# Patient Record
Sex: FEMALE | Race: BLACK OR AFRICAN AMERICAN | Employment: OTHER | URBAN - METROPOLITAN AREA
[De-identification: names, ages, dates, MRNs, and addresses within clinical notes are randomized per-mention and may not be internally consistent; named-entity substitution may affect disease eponyms.]

---

## 2017-03-27 ENCOUNTER — HOSPITAL ENCOUNTER (EMERGENCY)
Age: 67
Discharge: HOME | End: 2017-03-27
Payer: COMMERCIAL

## 2017-03-27 ENCOUNTER — APPOINTMENT (OUTPATIENT)
Dept: GENERAL RADIOLOGY | Age: 67
End: 2017-03-27
Payer: COMMERCIAL

## 2017-03-27 VITALS
SYSTOLIC BLOOD PRESSURE: 158 MMHG | HEART RATE: 70 BPM | TEMPERATURE: 98.6 F | RESPIRATION RATE: 16 BRPM | OXYGEN SATURATION: 98 % | WEIGHT: 143 LBS | DIASTOLIC BLOOD PRESSURE: 78 MMHG | BODY MASS INDEX: 25.33 KG/M2

## 2017-03-27 DIAGNOSIS — R91.1 INCIDENTAL LUNG NODULE: ICD-10-CM

## 2017-03-27 DIAGNOSIS — R07.9 CHEST PAIN, UNSPECIFIED TYPE: Primary | ICD-10-CM

## 2017-03-27 LAB
ALBUMIN SERPL-MCNC: 4.1 G/DL (ref 3.9–4.9)
ALP BLD-CCNC: 56 U/L (ref 40–130)
ALT SERPL-CCNC: 13 U/L (ref 0–33)
ANION GAP SERPL CALCULATED.3IONS-SCNC: 11 MEQ/L (ref 7–13)
AST SERPL-CCNC: 18 U/L (ref 0–35)
BILIRUB SERPL-MCNC: 0.4 MG/DL (ref 0–1.2)
BUN BLDV-MCNC: 19 MG/DL (ref 8–23)
CALCIUM SERPL-MCNC: 9.4 MG/DL (ref 8.6–10.2)
CHLORIDE BLD-SCNC: 99 MEQ/L (ref 98–107)
CO2: 24 MEQ/L (ref 22–29)
CREAT SERPL-MCNC: 0.89 MG/DL (ref 0.5–0.9)
EKG ATRIAL RATE: 72 BPM
EKG P AXIS: 64 DEGREES
EKG P-R INTERVAL: 204 MS
EKG Q-T INTERVAL: 408 MS
EKG QRS DURATION: 108 MS
EKG QTC CALCULATION (BAZETT): 446 MS
EKG R AXIS: -49 DEGREES
EKG T AXIS: 70 DEGREES
EKG VENTRICULAR RATE: 72 BPM
GFR AFRICAN AMERICAN: >60
GFR NON-AFRICAN AMERICAN: >60
GLOBULIN: 3.7 G/DL (ref 2.3–3.5)
GLUCOSE BLD-MCNC: 134 MG/DL (ref 74–109)
HCT VFR BLD CALC: 33.3 % (ref 37–47)
HEMOGLOBIN: 11.3 G/DL (ref 12–16)
MCH RBC QN AUTO: 27.7 PG (ref 27–31.3)
MCHC RBC AUTO-ENTMCNC: 34 % (ref 33–37)
MCV RBC AUTO: 81.5 FL (ref 82–100)
PDW BLD-RTO: 13.5 % (ref 11.5–14.5)
PLATELET # BLD: 260 K/UL (ref 130–400)
POTASSIUM SERPL-SCNC: 4.9 MEQ/L (ref 3.5–5.1)
RBC # BLD: 4.08 M/UL (ref 4.2–5.4)
SODIUM BLD-SCNC: 134 MEQ/L (ref 132–144)
TOTAL CK: 45 U/L (ref 0–170)
TOTAL PROTEIN: 7.8 G/DL (ref 6.4–8.1)
TROPONIN: <0.01 NG/ML (ref 0–0.01)
WBC # BLD: 5.5 K/UL (ref 4.8–10.8)

## 2017-03-27 PROCEDURE — 99285 EMERGENCY DEPT VISIT HI MDM: CPT

## 2017-03-27 PROCEDURE — 71010 XR CHEST PORTABLE: CPT

## 2017-03-27 PROCEDURE — 82550 ASSAY OF CK (CPK): CPT

## 2017-03-27 PROCEDURE — 36415 COLL VENOUS BLD VENIPUNCTURE: CPT

## 2017-03-27 PROCEDURE — 84484 ASSAY OF TROPONIN QUANT: CPT

## 2017-03-27 PROCEDURE — 80053 COMPREHEN METABOLIC PANEL: CPT

## 2017-03-27 PROCEDURE — 93005 ELECTROCARDIOGRAM TRACING: CPT

## 2017-03-27 PROCEDURE — 85027 COMPLETE CBC AUTOMATED: CPT

## 2017-03-27 RX ORDER — LEVOFLOXACIN 750 MG/1
750 TABLET ORAL DAILY
COMMUNITY
End: 2017-03-29 | Stop reason: ALTCHOICE

## 2017-03-27 RX ORDER — BENZONATATE 100 MG/1
100 CAPSULE ORAL 3 TIMES DAILY PRN
COMMUNITY
End: 2017-03-29 | Stop reason: ALTCHOICE

## 2017-03-27 RX ORDER — CEPHALEXIN 500 MG/1
500 CAPSULE ORAL 4 TIMES DAILY
COMMUNITY
End: 2017-03-29 | Stop reason: ALTCHOICE

## 2017-03-27 ASSESSMENT — ENCOUNTER SYMPTOMS
CHOKING: 0
DIARRHEA: 0
CONSTIPATION: 0
EYE DISCHARGE: 0
SORE THROAT: 0
ABDOMINAL PAIN: 0
VOMITING: 0
STRIDOR: 0
NAUSEA: 0
RHINORRHEA: 0
ABDOMINAL DISTENTION: 0
SHORTNESS OF BREATH: 0
WHEEZING: 0
COUGH: 0
COLOR CHANGE: 0

## 2017-03-29 ENCOUNTER — OFFICE VISIT (OUTPATIENT)
Dept: CARDIOLOGY | Age: 67
End: 2017-03-29

## 2017-03-29 VITALS
WEIGHT: 140.4 LBS | HEART RATE: 72 BPM | DIASTOLIC BLOOD PRESSURE: 70 MMHG | SYSTOLIC BLOOD PRESSURE: 120 MMHG | BODY MASS INDEX: 23.97 KG/M2 | HEIGHT: 64 IN | OXYGEN SATURATION: 98 %

## 2017-03-29 DIAGNOSIS — I73.9 PERIPHERAL ARTERIAL DISEASE (HCC): ICD-10-CM

## 2017-03-29 DIAGNOSIS — Z79.4 TYPE 2 DIABETES MELLITUS TREATED WITH INSULIN (HCC): ICD-10-CM

## 2017-03-29 DIAGNOSIS — R07.81 PLEURITIC CHEST PAIN: ICD-10-CM

## 2017-03-29 DIAGNOSIS — I10 ESSENTIAL HYPERTENSION: ICD-10-CM

## 2017-03-29 DIAGNOSIS — I25.10 CORONARY ARTERY DISEASE INVOLVING NATIVE CORONARY ARTERY OF NATIVE HEART WITHOUT ANGINA PECTORIS: ICD-10-CM

## 2017-03-29 DIAGNOSIS — R94.31 ABNORMAL EKG: ICD-10-CM

## 2017-03-29 DIAGNOSIS — E11.9 TYPE 2 DIABETES MELLITUS TREATED WITH INSULIN (HCC): ICD-10-CM

## 2017-03-29 DIAGNOSIS — E78.5 DYSLIPIDEMIA: ICD-10-CM

## 2017-03-29 PROCEDURE — 99214 OFFICE O/P EST MOD 30 MIN: CPT | Performed by: PHYSICIAN ASSISTANT

## 2017-03-29 RX ORDER — SIMVASTATIN 40 MG
40 TABLET ORAL NIGHTLY
Qty: 90 TABLET | Refills: 3 | Status: SHIPPED | OUTPATIENT
Start: 2017-03-29

## 2017-03-29 RX ORDER — ISOSORBIDE MONONITRATE 30 MG/1
30 TABLET, EXTENDED RELEASE ORAL DAILY
Qty: 90 TABLET | Refills: 3 | Status: SHIPPED | OUTPATIENT
Start: 2017-03-29 | End: 2018-04-20 | Stop reason: SDUPTHER

## 2017-03-29 ASSESSMENT — ENCOUNTER SYMPTOMS
VOMITING: 0
SINUS PRESSURE: 0
NAUSEA: 0
BLOOD IN STOOL: 0
COLOR CHANGE: 0
CHEST TIGHTNESS: 0
SHORTNESS OF BREATH: 0
DIARRHEA: 0
COUGH: 1
RHINORRHEA: 0

## 2017-08-01 RX ORDER — ISOSORBIDE MONONITRATE 30 MG/1
TABLET, EXTENDED RELEASE ORAL
Qty: 90 TABLET | Refills: 2 | Status: SHIPPED | OUTPATIENT
Start: 2017-08-01 | End: 2017-09-13

## 2017-08-31 ENCOUNTER — HOSPITAL ENCOUNTER (OUTPATIENT)
Dept: CT IMAGING | Age: 67
Discharge: HOME | End: 2017-08-31
Payer: MEDICARE

## 2017-08-31 VITALS
SYSTOLIC BLOOD PRESSURE: 155 MMHG | BODY MASS INDEX: 23.9 KG/M2 | HEIGHT: 64 IN | RESPIRATION RATE: 16 BRPM | DIASTOLIC BLOOD PRESSURE: 71 MMHG | WEIGHT: 140 LBS | HEART RATE: 72 BPM

## 2017-08-31 DIAGNOSIS — R91.1 LUNG NODULE: ICD-10-CM

## 2017-08-31 PROCEDURE — 71250 CT THORAX DX C-: CPT

## 2017-09-13 ENCOUNTER — OFFICE VISIT (OUTPATIENT)
Dept: PULMONOLOGY | Age: 67
End: 2017-09-13

## 2017-09-13 VITALS
RESPIRATION RATE: 18 BRPM | OXYGEN SATURATION: 98 % | HEIGHT: 64 IN | HEART RATE: 72 BPM | DIASTOLIC BLOOD PRESSURE: 90 MMHG | SYSTOLIC BLOOD PRESSURE: 154 MMHG | TEMPERATURE: 96.9 F | BODY MASS INDEX: 24.75 KG/M2 | WEIGHT: 145 LBS

## 2017-09-13 DIAGNOSIS — J84.10 POSTINFLAMMATORY PULMONARY FIBROSIS (HCC): Primary | ICD-10-CM

## 2017-09-13 DIAGNOSIS — J47.9 BRONCHIECTASIS WITHOUT COMPLICATION (HCC): ICD-10-CM

## 2017-09-13 PROCEDURE — 99204 OFFICE O/P NEW MOD 45 MIN: CPT | Performed by: INTERNAL MEDICINE

## 2017-09-13 ASSESSMENT — ENCOUNTER SYMPTOMS
DIARRHEA: 0
CHEST TIGHTNESS: 0
SORE THROAT: 0
NAUSEA: 0
SHORTNESS OF BREATH: 0
COUGH: 0
VOMITING: 0
SINUS PRESSURE: 0
ABDOMINAL PAIN: 0
RHINORRHEA: 0
WHEEZING: 0

## 2017-10-02 ENCOUNTER — HOSPITAL ENCOUNTER (OUTPATIENT)
Dept: GENERAL RADIOLOGY | Age: 67
Discharge: HOME | End: 2017-10-02
Payer: MEDICARE

## 2017-10-02 DIAGNOSIS — R60.9 EDEMA, UNSPECIFIED TYPE: ICD-10-CM

## 2017-10-02 PROCEDURE — 73630 X-RAY EXAM OF FOOT: CPT

## 2017-11-14 ENCOUNTER — HOSPITAL ENCOUNTER (OUTPATIENT)
Dept: WOMENS IMAGING | Age: 67
Discharge: HOME | End: 2017-11-14
Payer: MEDICARE

## 2017-11-14 DIAGNOSIS — Z12.31 ENCOUNTER FOR SCREENING MAMMOGRAM FOR BREAST CANCER: ICD-10-CM

## 2017-11-14 PROCEDURE — 77063 BREAST TOMOSYNTHESIS BI: CPT

## 2017-12-08 ENCOUNTER — HOSPITAL ENCOUNTER (OUTPATIENT)
Dept: CT IMAGING | Age: 67
Discharge: HOME | End: 2017-12-08
Payer: MEDICARE

## 2017-12-08 DIAGNOSIS — J47.9 BRONCHIECTASIS WITHOUT COMPLICATION (HCC): ICD-10-CM

## 2017-12-08 DIAGNOSIS — J84.10 POSTINFLAMMATORY PULMONARY FIBROSIS (HCC): ICD-10-CM

## 2017-12-08 PROCEDURE — 71250 CT THORAX DX C-: CPT

## 2018-04-24 RX ORDER — ISOSORBIDE MONONITRATE 30 MG/1
30 TABLET, EXTENDED RELEASE ORAL DAILY
Qty: 90 TABLET | Refills: 2 | Status: SHIPPED | OUTPATIENT
Start: 2018-04-24

## 2018-09-26 ENCOUNTER — HOSPITAL ENCOUNTER (OUTPATIENT)
Dept: GENERAL RADIOLOGY | Age: 68
Discharge: HOME | End: 2018-09-28

## 2018-09-26 DIAGNOSIS — M79.672 LEFT FOOT PAIN: ICD-10-CM

## 2018-09-26 PROCEDURE — 73630 X-RAY EXAM OF FOOT: CPT

## 2019-02-22 RX ORDER — ISOSORBIDE MONONITRATE 30 MG/1
30 TABLET, EXTENDED RELEASE ORAL DAILY
Qty: 90 TABLET | Refills: 2 | OUTPATIENT
Start: 2019-02-22

## 2023-06-20 ENCOUNTER — HOSPITAL ENCOUNTER (EMERGENCY)
Age: 73
Discharge: HOME | End: 2023-06-20
Attending: EMERGENCY MEDICINE
Payer: COMMERCIAL

## 2023-06-20 ENCOUNTER — APPOINTMENT (OUTPATIENT)
Dept: CT IMAGING | Age: 73
End: 2023-06-20
Payer: COMMERCIAL

## 2023-06-20 VITALS
DIASTOLIC BLOOD PRESSURE: 68 MMHG | HEIGHT: 65 IN | TEMPERATURE: 98.3 F | WEIGHT: 140 LBS | HEART RATE: 77 BPM | RESPIRATION RATE: 20 BRPM | SYSTOLIC BLOOD PRESSURE: 106 MMHG | BODY MASS INDEX: 23.32 KG/M2 | OXYGEN SATURATION: 94 %

## 2023-06-20 DIAGNOSIS — S01.81XA FACIAL LACERATION, INITIAL ENCOUNTER: Primary | ICD-10-CM

## 2023-06-20 DIAGNOSIS — S09.90XA CLOSED HEAD INJURY, INITIAL ENCOUNTER: ICD-10-CM

## 2023-06-20 PROCEDURE — 12011 RPR F/E/E/N/L/M 2.5 CM/<: CPT

## 2023-06-20 PROCEDURE — 70450 CT HEAD/BRAIN W/O DYE: CPT

## 2023-06-20 PROCEDURE — 99285 EMERGENCY DEPT VISIT HI MDM: CPT

## 2023-06-20 PROCEDURE — 6370000000 HC RX 637 (ALT 250 FOR IP): Performed by: EMERGENCY MEDICINE

## 2023-06-20 PROCEDURE — 2500000003 HC RX 250 WO HCPCS: Performed by: EMERGENCY MEDICINE

## 2023-06-20 RX ORDER — LIDOCAINE HYDROCHLORIDE 20 MG/ML
5 INJECTION, SOLUTION INFILTRATION; PERINEURAL ONCE
Status: COMPLETED | OUTPATIENT
Start: 2023-06-20 | End: 2023-06-20

## 2023-06-20 RX ORDER — CEPHALEXIN 500 MG/1
500 CAPSULE ORAL 3 TIMES DAILY
Qty: 15 CAPSULE | Refills: 0 | Status: SHIPPED | OUTPATIENT
Start: 2023-06-20 | End: 2023-06-25

## 2023-06-20 RX ORDER — BACITRACIN ZINC 500 [USP'U]/G
OINTMENT TOPICAL ONCE
Status: COMPLETED | OUTPATIENT
Start: 2023-06-20 | End: 2023-06-20

## 2023-06-20 RX ADMIN — BACITRACIN ZINC: 500 OINTMENT TOPICAL at 13:50

## 2023-06-20 RX ADMIN — LIDOCAINE HYDROCHLORIDE 5 ML: 20 INJECTION, SOLUTION INFILTRATION; PERINEURAL at 13:21

## 2023-06-20 ASSESSMENT — PAIN SCALES - GENERAL: PAINLEVEL_OUTOF10: 10

## 2023-06-20 ASSESSMENT — LIFESTYLE VARIABLES
HOW OFTEN DO YOU HAVE A DRINK CONTAINING ALCOHOL: NEVER
HOW MANY STANDARD DRINKS CONTAINING ALCOHOL DO YOU HAVE ON A TYPICAL DAY: PATIENT DOES NOT DRINK

## 2023-06-20 ASSESSMENT — PAIN DESCRIPTION - ORIENTATION: ORIENTATION: RIGHT

## 2023-06-20 ASSESSMENT — PAIN DESCRIPTION - LOCATION: LOCATION: EYE

## 2023-06-20 NOTE — ED PROVIDER NOTES
[address] [facility] ED  eMERGENCY dEPARTMENT eNCOUnter      Pt Name: Steven Ramirez  MRN: [id number]  [place] 1950  Date of evaluation: 6/20/2023  Provider: Khadijah Dickson MD    [address] [place] [address]       Chief Complaint   Patient presents with    Fall    Head Injury         HISTORY OF PRESENT ILLNESS   (Location/Symptom, Timing/Onset,Context/Setting, Quality, Duration, Modifying Factors, Severity)  Note limiting factors. Steven Ramirez is a 67 y.o. female who presents to the emergency departmen history of while at work she tripped on a step and fell forward and hit the right side of her forehead. Patient had no loss of consciousness. Patient denies any loss of bowel or bladder control. There is pain only over the right eyelid where the laceration is. Patient denies any numbness or tingling in the arms or legs loss of consciousness. Patient denies any headache chest pain abdominal pain nausea vomiting diarrhea diaphoresis. Patient states she feels fine other than the cut over her right eye. Patient is not on blood thinners other than NSAID in the morning for pain. HPI    NursingNotes were reviewed. REVIEW OF SYSTEMS    (2-9 systems for level 4, 10 or more for level 5)     Review of Systems    Except as noted above the remainder of the review of systems was reviewed and negative.        PAST MEDICAL HISTORY     Past Medical History:   Diagnosis Date    A-fib [facility])     Chronic diastolic heart failure ([facility] [address] [address] 10/8/2015    CKD (chronic kidney disease), stage III ([address] 10/8/2015    Coronary artery disease involving native coronary artery of native heart without angina pectoris 10/8/2015    Essential hypertension 10/8/2015    Family history of coronary artery disease 10/8/2015    History of non-ST elevation myocardial infarction (NSTEMI) 10/8/2015    HTN (hypertension)     Hyperlipidemia 10/8/2015    PAD (peripheral artery disease) ([address] 10/8/2015         SURGICALHISTORY       Past Surgical History:

## 2023-06-20 NOTE — ED TRIAGE NOTES
Pt states that she was at work and tripped over a step while trying to go to the restroom. Pt states that she hit her head, denies LOC, and has a laceration to the right eyebrow. Pt denies any dizziness, blurred vision, or headache. Pt states she takes ASA daily. Incident happened at 1120 this morning.

## 2023-06-20 NOTE — DISCHARGE INSTRUCTIONS
This can be removed in 7 days. For any worsening of any of your condition come back to the ER right away for more open treatment or call 911.

## 2023-06-20 NOTE — ED NOTES
Pt. Had stitches placed over right eye  Swelling noted  Pt. Informed to take otc meds at home for pain/swelling and advised to use ice on the area  Pt. Informed to return if wound seems worse  Otherwise informed to keep wound clean and dry. Given supplies for dressing.         Manpreet Quick RN  06/20/23 [id number]

## 2023-07-21 ENCOUNTER — TRANSCRIBE ORDERS (OUTPATIENT)
Dept: ADMINISTRATIVE | Age: 73
End: 2023-07-21

## 2023-07-21 DIAGNOSIS — S43.401A SPRAIN OF RIGHT SHOULDER, UNSPECIFIED SHOULDER SPRAIN TYPE, INITIAL ENCOUNTER: Primary | ICD-10-CM

## 2023-07-21 DIAGNOSIS — S40.021A CONTUSION OF MULTIPLE SITES OF RIGHT UPPER EXTREMITY, INITIAL ENCOUNTER: ICD-10-CM

## 2023-07-24 ENCOUNTER — HOSPITAL ENCOUNTER (OUTPATIENT)
Dept: MRI IMAGING | Age: 73
Discharge: HOME | End: 2023-07-26
Payer: COMMERCIAL

## 2023-07-24 DIAGNOSIS — S43.401A SPRAIN OF RIGHT SHOULDER, UNSPECIFIED SHOULDER SPRAIN TYPE, INITIAL ENCOUNTER: ICD-10-CM

## 2023-07-24 DIAGNOSIS — S40.021A CONTUSION OF MULTIPLE SITES OF RIGHT UPPER EXTREMITY, INITIAL ENCOUNTER: ICD-10-CM

## 2023-07-24 PROCEDURE — 73221 MRI JOINT UPR EXTREM W/O DYE: CPT

## 2023-07-24 PROCEDURE — 73218 MRI UPPER EXTREMITY W/O DYE: CPT

## 2023-08-02 ENCOUNTER — HOSPITAL ENCOUNTER (OUTPATIENT)
Dept: PHYSICAL THERAPY | Age: 73
Setting detail: THERAPIES SERIES
Discharge: HOME | End: 2023-08-02
Payer: COMMERCIAL

## 2023-08-02 PROCEDURE — 97161 PT EVAL LOW COMPLEX 20 MIN: CPT

## 2023-08-02 PROCEDURE — 97110 THERAPEUTIC EXERCISES: CPT

## 2023-08-02 ASSESSMENT — PAIN DESCRIPTION - ORIENTATION: ORIENTATION: RIGHT

## 2023-08-02 ASSESSMENT — PAIN DESCRIPTION - PAIN TYPE: TYPE: ACUTE PAIN

## 2023-08-02 ASSESSMENT — PAIN DESCRIPTION - LOCATION: LOCATION: ARM;SHOULDER

## 2023-08-02 ASSESSMENT — PAIN DESCRIPTION - DESCRIPTORS: DESCRIPTORS: ACHING;SHOOTING;SHARP

## 2023-08-02 ASSESSMENT — PAIN SCALES - GENERAL: PAINLEVEL_OUTOF10: 8

## 2023-08-02 NOTE — THERAPY EVALUATION
[id number] [facility]     Ph: [phone]  Fax: [phone]    [] Certification  [] Recertification [x]  Plan of Care  [] Progress Note [] Discharge      Referring Provider: Papi Winters MD    From:  Ebony Ordaz, PT,DPT  Patient: Carlos Robert (45 y.o. female) : 1950 Date: 2023   Medical Diagnosis: Unspecified sprain of right shoulder joint, initial encounter [S43.401A]  Posterior subluxation of right humerus, initial encounter [S43.021A]  Laceration without foreign body of unspecified part of head, initial encounter Gregary Sprang  Contusion of unspecified part of head, initial encounter [S00.93XA]    Treatment Diagnosis: R shoulder pain, reduced ROM, reduced strength, difficulty with ADLs    Plan of Care/Certification Expiration Date: 23   Progress Report Period from: 2023  to 2023  Visits to Date: 1 No Show:   Cancelled Appts:      OBJECTIVE:   Long Term Goals - Time Frame for Long Term Goals : 4 weeks  Goals Current/ Discharge status Status   Long Term Goal 1: patient will improve R shoulder ROM flexion and abduction to >/=120deg and IR/ER to equal opposite side to demonstrate improvements in bathing and dressing tasks AROM LUE (degrees)  L Shoulder Flexion (0-180): 135  L Shoulder Extension (0-45): 35  L Shoulder ABduction (0-180): 137  L Shoulder Int Rotation  (0-70): T8  L Shoulder Ext Rotation  (0-90): T3  L Elbow Flexion (0-145): WNL  L Elbow Extension (145-0): WNL   AROM RUE (degrees)  R Shoulder Flexion (0-180): 55  R Shoulder Extension (0-45): 12  R Shoulder ABduction (0-180): 44  R Shoulder Int Rotation  (0-70): R ear  R Shoulder Ext Rotation (0-90): R glute  R Elbow Flexion (0-145):  WNL  R Elbow Extension (145-0): WNL          PROM RUE (degrees)  R Shoulder Flex  (0-180): 104  R Shoulder ABduction (0-180): 94 New   Long Term Goal 2: patient will improve UE strength

## 2023-08-02 NOTE — PROGRESS NOTES
[facility]  PHYSICAL THERAPY EVALUATION    Physical Therapy: Initial Evaluation    Patient: Panfilo Mcdonald (89 y.o.     female)   Examination Date: 2023   :  1950 ;    Confirmed: Yes MRN: [id number]  CSN: [id number]   Insurance: Payor: 3-HAB / Plan: 3-HAB / Product Type: *No Product type* /   Insurance ID:  - (Worker's Comp) Secondary Insurance (if applicable):    Referring Physician: Tony Adamson MD       Visits to Date/Visits Approved:     No Show/Cancelled Appts:   /       Medical Diagnosis: Unspecified sprain of right shoulder joint, initial encounter [S43.401A]  Posterior subluxation of right humerus, initial encounter [S43.021A]  Laceration without foreign body of unspecified part of head, initial encounter [S01.91XA]  Contusion of unspecified part of head, initial encounter [S00.93XA]        Treatment Diagnosis: R shoulder pain, reduced ROM, reduced strength, difficulty with ADLs     PERTINENT MEDICAL HISTORY   Patient Assessed for Rehabilitation Services: Yes       Medical History: Chart Reviewed: Yes   Past Medical History:   Diagnosis Date    A-fib ([address])     Chronic diastolic heart failure ([address]) 10/8/2015    CKD (chronic kidney disease), stage III ([address]) 10/8/2015    Coronary artery disease involving native coronary artery of native heart without angina pectoris 10/8/2015    Essential hypertension 10/8/2015    Family history of coronary artery disease 10/8/2015    History of non-ST elevation myocardial infarction (NSTEMI) 10/8/2015    HTN (hypertension)     Hyperlipidemia 10/8/2015    PAD (peripheral artery disease) ([address]) 10/8/2015     Surgical History:   Past Surgical History:   Procedure Laterality Date    CARDIAC CATHETERIZATION  09/10/15    [facility] DO    CARDIAC CATHETERIZATION      TOE AMPUTATION Right 2016       Medications:   Current Outpatient Medications:     isosorbide mononitrate (IMDUR) 30 MG extended

## 2023-08-07 ENCOUNTER — HOSPITAL ENCOUNTER (OUTPATIENT)
Dept: PHYSICAL THERAPY | Age: 73
Setting detail: THERAPIES SERIES
Discharge: HOME | End: 2023-08-07
Payer: COMMERCIAL

## 2023-08-07 PROCEDURE — 97110 THERAPEUTIC EXERCISES: CPT

## 2023-08-07 ASSESSMENT — PAIN DESCRIPTION - LOCATION: LOCATION: ARM;SHOULDER

## 2023-08-07 ASSESSMENT — PAIN SCALES - GENERAL: PAINLEVEL_OUTOF10: 5

## 2023-08-07 ASSESSMENT — PAIN DESCRIPTION - DESCRIPTORS: DESCRIPTORS: ACHING

## 2023-08-07 ASSESSMENT — PAIN DESCRIPTION - PAIN TYPE: TYPE: ACUTE PAIN

## 2023-08-07 NOTE — PROGRESS NOTES
[facility]  Outpatient Physical Therapy   Treatment Note        Date: 2023  Patient: Paty Patel  : 1950   Confirmed: Yes  MRN: [id number]  Referring Provider: Alison Gates MD      Medical Diagnosis: Unspecified sprain of right shoulder joint, initial encounter [S43.401A]  Posterior subluxation of right humerus, initial encounter [S43.021A]  Laceration without foreign body of unspecified part of head, initial encounter Susi Zelalem  Contusion of unspecified part of head, initial encounter [S00.93XA]      Treatment Diagnosis: R shoulder pain, reduced ROM, reduced strength, difficulty with ADLs    Visit Information:  Insurance: Payor: 3-HAB / Plan: 3-HAB / Product Type: *No Product type* /   PT Visit Information  Onset Date: 23  PT Insurance Information: 3-[facility])  Total # of Visits Approved: 12  Total # of Visits to Date: 2  Plan of Care/Certification Expiration Date: 23  Progress Note Counter:  (C-9: [id number])    Subjective Information:  Subjective: Reports stiffness in arm is reduced, however reports achiness in bone. Reports HEP is going well.  Reports significant pain in shoulder especially when waking up in the morning, causing difficulty getting out of bed  HEP Compliance:  [x] Good [] Fair [] Poor [] Reports not doing due to:    Restrictions/Precautions: None          Pain Screening  Patient Currently in Pain: Yes  Pain Level: 5  Pain Type: Acute pain  Pain Location: Arm, Shoulder  Pain Descriptors: Aching    Treatment:  Exercises:  Exercises  Exercise 1: PROM: flexion, abd, ER x6 min  Exercise 2: dowel: flexion, abduction, ER 2 sets x 10 reps x 3-5 sec holds  Exercise 5: GHJ isometric: flexion, abd, IR/ER 10 reps x 3-5 sec holds  Exercise 6: scap retract x15 reps  Exercise 8: pulleys flexion and abduction x2 min, each  Exercise 13: shoulder rolls fwd/retro x10 reps, each  Exercise 20: HEP: Continue current + shoulder iso, scap retract, shoulder rolls

## 2023-08-10 ENCOUNTER — HOSPITAL ENCOUNTER (OUTPATIENT)
Dept: PHYSICAL THERAPY | Age: 73
Setting detail: THERAPIES SERIES
Discharge: HOME | End: 2023-08-10
Payer: COMMERCIAL

## 2023-08-10 PROCEDURE — G0283 ELEC STIM OTHER THAN WOUND: HCPCS

## 2023-08-10 PROCEDURE — 97110 THERAPEUTIC EXERCISES: CPT

## 2023-08-10 ASSESSMENT — PAIN DESCRIPTION - LOCATION: LOCATION: ARM;SHOULDER

## 2023-08-10 ASSESSMENT — PAIN DESCRIPTION - DESCRIPTORS: DESCRIPTORS: DULL

## 2023-08-10 ASSESSMENT — PAIN DESCRIPTION - PAIN TYPE: TYPE: ACUTE PAIN

## 2023-08-10 ASSESSMENT — PAIN DESCRIPTION - ORIENTATION: ORIENTATION: RIGHT

## 2023-08-10 ASSESSMENT — PAIN SCALES - GENERAL: PAINLEVEL_OUTOF10: 3

## 2023-08-10 NOTE — PROGRESS NOTES
[facility]  Outpatient Physical Therapy    Treatment Note        Date: 8/10/2023  Patient: Jannie Juarez  : 1950   Confirmed: Yes  MRN: [id number]  Referring Provider: Ziggy Nails MD    Medical Diagnosis: Unspecified sprain of right shoulder joint, initial encounter [S43.401A]  Posterior subluxation of right humerus, initial encounter [S43.021A]  Laceration without foreign body of unspecified part of head, initial encounter [S01.91XA]  Contusion of unspecified part of head, initial encounter [S00.93XA]       Treatment Diagnosis: R shoulder pain, reduced ROM, reduced strength, difficulty with ADLs    Visit Information:  Insurance: Payor: 3-HAB / Plan: 3-HAB / Product Type: *No Product type* /   PT Visit Information  Onset Date: 23  PT Insurance Information: 3-[facility])  Total # of Visits Approved: 12  Total # of Visits to Date: 3  Plan of Care/Certification Expiration Date: 23  No Show: 0  Canceled Appointment: 0  Progress Note Counter: 3/12 (C-9: [id number])    Subjective Information:  Subjective: Pt reports reduced pain upon waking and entering clinic,Pt states she was injected in her shoulder with a mm relaxer on Tuesday. Pt states the MD said it is a bone contusion  HEP Compliance:  [x] Good [x] Fair [] Poor [] Reports not doing due to:               Pain Screening  Patient Currently in Pain: Yes  Pain Assessment: 0-10  Pain Level: 3  Pain Type: Acute pain  Pain Location: Arm, Shoulder  Pain Orientation: Right  Pain Descriptors: Dull    Treatment:  Exercises:  Exercises  Exercise 1: PROM: flexion, abd, ER x6 min  Exercise 2: dowel: flexion, abduction, ER 2 sets x 10 reps x 3-5 sec holds  Exercise 6: scap retract x15 reps  Exercise 7: Gentle 3-way bicep x 10 in supine with towel support under elbow  Exercise 8: pulleys flexion and abduction x2 min, each  Exercise 13: shoulder rolls fwd/retro x10 reps, each  Exercise 20: HEP: Continue current +       Modalities:  Cryotherapy

## 2023-08-14 ENCOUNTER — HOSPITAL ENCOUNTER (OUTPATIENT)
Dept: PHYSICAL THERAPY | Age: 73
Setting detail: THERAPIES SERIES
Discharge: HOME | End: 2023-08-14
Payer: COMMERCIAL

## 2023-08-14 PROCEDURE — G0283 ELEC STIM OTHER THAN WOUND: HCPCS

## 2023-08-14 PROCEDURE — 97110 THERAPEUTIC EXERCISES: CPT

## 2023-08-14 ASSESSMENT — PAIN DESCRIPTION - LOCATION: LOCATION: SHOULDER

## 2023-08-14 ASSESSMENT — PAIN DESCRIPTION - DESCRIPTORS: DESCRIPTORS: ACHING

## 2023-08-14 ASSESSMENT — PAIN DESCRIPTION - PAIN TYPE: TYPE: ACUTE PAIN

## 2023-08-14 ASSESSMENT — PAIN SCALES - GENERAL: PAINLEVEL_OUTOF10: 6

## 2023-08-14 ASSESSMENT — PAIN DESCRIPTION - ORIENTATION: ORIENTATION: RIGHT

## 2023-08-14 NOTE — PROGRESS NOTES
activities, such as tolerance to lifting. ROM reassessed with improvements globally, however greatest in abd, flexion and ext. overall patient tolerated tx well with minimal verbal increase in familiar pain, however concluded with CP and IFC to R shoulder for pain reduction. Treatment Diagnosis: R shoulder pain, reduced ROM, reduced strength, difficulty with ADLs     PT Education: Home Exercise Program, Body mechanics  Activity Tolerance  Activity Tolerance: Patient tolerated treatment well    Post-Pain Assessment:       Pain Rating (0-10 pain scale):   /10   Location and pain description same as pre-treatment unless indicated. Action: [] NA   [x] Perform HEP  [] Meds as prescribed  [] Modalities as prescribed   [] Call Physician     GOALS   Patient Goal(s): Patient Goals : To get better    Long Term Goals Completed by 4 weeks Goal Status   LTG 1 patient will improve R shoulder ROM flexion and abduction to >/=120deg and IR/ER to equal opposite side to demonstrate improvements in bathing and dressing tasks In progress   LTG 2 patient will improve UE strength to >/=4/5 for improvements in carrying tasks at wokr In progress   LTG 3 patient will subjectively report avg pain of </=2-3/10 during ADL tasks to demonstrate improvements in pain tolerance and functional mobility In progress   LTG 4 patient will be indep with HEP to self manage symptoms upon d/c In progress   LTG 5 patient will improve UEFI to >/=50/80 for improvements in QOL In progress          Plan:  Frequency/Duration:  Plan  Plan Frequency: 2x  Plan weeks: 4  Current Treatment Recommendations: Strengthening, ROM, Neuromuscular re-education, Manual, Pain management, Return to work related activity, Home exercise program, Patient/Caregiver education & training, Modalities  Modalities: Heat/Cold, E-stim - unattended, Ultrasound  Pt to continue current HEP.   See objective section for any therapeutic exercise changes, additions or modifications this

## 2023-08-17 ENCOUNTER — HOSPITAL ENCOUNTER (OUTPATIENT)
Dept: PHYSICAL THERAPY | Age: 73
Setting detail: THERAPIES SERIES
Discharge: HOME | End: 2023-08-17
Payer: COMMERCIAL

## 2023-08-17 PROCEDURE — G0283 ELEC STIM OTHER THAN WOUND: HCPCS

## 2023-08-17 PROCEDURE — 97035 APP MDLTY 1+ULTRASOUND EA 15: CPT

## 2023-08-17 PROCEDURE — 97110 THERAPEUTIC EXERCISES: CPT

## 2023-08-17 ASSESSMENT — PAIN DESCRIPTION - PAIN TYPE: TYPE: ACUTE PAIN

## 2023-08-17 ASSESSMENT — PAIN SCALES - GENERAL: PAINLEVEL_OUTOF10: 6

## 2023-08-17 ASSESSMENT — PAIN DESCRIPTION - ORIENTATION: ORIENTATION: RIGHT

## 2023-08-17 ASSESSMENT — PAIN DESCRIPTION - DESCRIPTORS: DESCRIPTORS: ACHING

## 2023-08-17 ASSESSMENT — PAIN DESCRIPTION - LOCATION: LOCATION: SHOULDER

## 2023-08-17 NOTE — PROGRESS NOTES
[facility]  Outpatient Physical Therapy   Treatment Note        Date: 2023  Patient: Davey Carranza  : 1950   Confirmed: Yes  MRN: [id number]  Referring Provider: Gómez Haile MD      Medical Diagnosis: Unspecified sprain of right shoulder joint, initial encounter [S43.401A]  Posterior subluxation of right humerus, initial encounter [S43.021A]  Laceration without foreign body of unspecified part of head, initial encounter [S01.91XA]  Contusion of unspecified part of head, initial encounter [S00.93XA]      Treatment Diagnosis: R shoulder pain, reduced ROM, reduced strength, difficulty with ADLs    Visit Information:  Insurance: Payor: 3-HAB / Plan: 3-HAB / Product Type: *No Product type* /   PT Visit Information  Onset Date: 23  PT Insurance Information: 3-[facility])  Total # of Visits Approved: 12  Total # of Visits to Date: 5  Plan of Care/Certification Expiration Date: 23  No Show: 0  Canceled Appointment: 0  Progress Note Counter:  (C-9: [id number])    Subjective Information:  Subjective: Patient reports she does not think therapy is helping too much with the pain, however reports she does not feel as stiff as when first starting. Reports the pain continues to wake her up in the middle of the night. States she will be going to see an orthopedic.   HEP Compliance:  [x] Good [] Fair [] Poor [] Reports not doing due to:    Pain Screening  Patient Currently in Pain: Yes  Pain Level: 6  Pain Type: Acute pain  Pain Location: Shoulder  Pain Orientation: Right  Pain Descriptors: Aching    Treatment:  Exercises:  Exercises  Exercise 3: wall slides: flexion and scaption 10 reps x 5\" holds  Exercise 6: Rows and lats x10 reps x YTB, each  Exercise 9: reactive IR / ER YTB x3 steps x 10 reps, each  Exercise 12: standing AROM: flexion, extension, and abduction x15 reps, each  Exercise 14: UBE fwd/retro L1.0 x5 min total  Exercise 20: HEP: Continue current     Modalities:  Cryotherapy

## 2023-08-21 ENCOUNTER — HOSPITAL ENCOUNTER (OUTPATIENT)
Dept: PHYSICAL THERAPY | Age: 73
Setting detail: THERAPIES SERIES
Discharge: HOME | End: 2023-08-21
Payer: COMMERCIAL

## 2023-08-21 PROCEDURE — 97110 THERAPEUTIC EXERCISES: CPT

## 2023-08-21 ASSESSMENT — PAIN DESCRIPTION - LOCATION: LOCATION: SHOULDER

## 2023-08-21 ASSESSMENT — PAIN DESCRIPTION - ORIENTATION: ORIENTATION: RIGHT

## 2023-08-21 ASSESSMENT — PAIN DESCRIPTION - DESCRIPTORS: DESCRIPTORS: ACHING

## 2023-08-21 ASSESSMENT — PAIN DESCRIPTION - PAIN TYPE: TYPE: ACUTE PAIN

## 2023-08-21 ASSESSMENT — PAIN SCALES - GENERAL: PAINLEVEL_OUTOF10: 8

## 2023-08-21 NOTE — PROGRESS NOTES
[facility]  Outpatient Physical Therapy    Treatment Note        Date: 2023  Patient: Jannie Juarez  : 1950   Confirmed: Yes  MRN: [id number]  Referring Provider: Ziggy Nails MD    Medical Diagnosis: Unspecified sprain of right shoulder joint, initial encounter [S43.401A]  Posterior subluxation of right humerus, initial encounter [S43.021A]  Laceration without foreign body of unspecified part of head, initial encounter [S01.91XA]  Contusion of unspecified part of head, initial encounter [S00.93XA]       Treatment Diagnosis: R shoulder pain, reduced ROM, reduced strength, difficulty with ADLs    Visit Information:  Insurance: Payor: 3-HAB / Plan: 3-HAB / Product Type: *No Product type* /   PT Visit Information  Onset Date: 23  PT Insurance Information: 3-[facility])  Total # of Visits Approved: 12  Total # of Visits to Date: 6  Plan of Care/Certification Expiration Date: 23  No Show: 0  Canceled Appointment: 0  Progress Note Counter:  (C-9: [id number])    Subjective Information:  Subjective: Pt states she is continuing to have pain but keeps functional movement to perform ADL's  HEP Compliance:  [x] Good [x] Fair [] Poor [] Reports not doing due to:               Pain Screening  Patient Currently in Pain: Yes  Pain Assessment: 0-10  Pain Level: 8  Pain Type: Acute pain  Pain Location: Shoulder  Pain Orientation: Right  Pain Descriptors: Aching    Treatment:  Exercises:  Exercises  Exercise 3: wall slides: flexion and scaption 10 reps x 5\" holds  Exercise 5: GHJ isometric: ext x 10  flex/abd,  5 reps x 3-5 sec holds  Exercise 6: Rows and lats x10 reps x YTB, each  Exercise 9: reactive IR / ER YTB x3 steps x 10 reps, each  Exercise 12: standing AROM: flexion, extension, and abduction x15 reps, each  Exercise 14: UBE fwd/retro L1.0 x5 min total       Manual: NA          Modalities:  Cryotherapy (CPT 33251)  Patient Position: Seated  Number Minutes Cryotherapy:

## 2023-08-24 ENCOUNTER — HOSPITAL ENCOUNTER (OUTPATIENT)
Dept: PHYSICAL THERAPY | Age: 73
Setting detail: THERAPIES SERIES
Discharge: HOME | End: 2023-08-24
Payer: COMMERCIAL

## 2023-08-24 PROCEDURE — 97110 THERAPEUTIC EXERCISES: CPT

## 2023-08-24 PROCEDURE — G0283 ELEC STIM OTHER THAN WOUND: HCPCS

## 2023-08-24 ASSESSMENT — PAIN DESCRIPTION - DESCRIPTORS: DESCRIPTORS: ACHING

## 2023-08-24 ASSESSMENT — PAIN DESCRIPTION - ORIENTATION: ORIENTATION: RIGHT

## 2023-08-24 ASSESSMENT — PAIN DESCRIPTION - LOCATION: LOCATION: SHOULDER

## 2023-08-24 ASSESSMENT — PAIN SCALES - GENERAL: PAINLEVEL_OUTOF10: 9

## 2023-08-24 NOTE — PROGRESS NOTES
[id number] [facility]     Ph: [phone]  Fax: [phone]    [] Certification  [] Recertification []  Plan of Care  [] Progress Note [x] Discharge      Referring Provider: Erik Salgado MD    From:  [facility], PT, DPT  Patient: Petra Celeste (19 y.o. female) : 1950 Date: 2023   Medical Diagnosis: Unspecified sprain of right shoulder joint, initial encounter [S43.401A]  Posterior subluxation of right humerus, initial encounter [S43.021A]  Laceration without foreign body of unspecified part of head, initial encounter [S01.91XA]  Contusion of unspecified part of head, initial encounter [S00.93XA]    Treatment Diagnosis: R shoulder pain, reduced ROM, reduced strength, difficulty with ADLs    Plan of Care/Certification Expiration Date: 23   Progress Report Period from: 2023  to 2023  Visits to Date: 7 No Show: 0 Cancelled Appts: 0    OBJECTIVE:     Long Term Goals - Time Frame for Long Term Goals : 4 weeks  Goals Current/ Discharge status Status   Long Term Goal 1: patient will improve R shoulder ROM flexion and abduction to >/=120deg and IR/ER to equal opposite side to demonstrate improvements in bathing and dressing tasks LTG 1 Current Status[de-identified] 23 Right shoulder flexion 96 deg., Ext 32 deg., Abd 65 deg., IR PSIS, ER C 1   Not Met   Long Term Goal 2: patient will improve UE strength to >/=4/5 for improvements in carrying tasks at [facility] LTG 2 Current Status[de-identified] 23 Right shoulder flexion & Abduction 4/5, IR 4-4+/5, ER 4/5 pain with all motions   Met   Long Term Goal 3: patient will subjectively report avg pain of </=2-3/10 during ADL tasks to demonstrate improvements in pain tolerance and functional mobility LTG 3 Current Status[de-identified] 23 Pt states 9/10 constant ache.    Not Met   Long Term Goal 4: patient will be indep with HEP to self manage symptoms upon d/c LTG 4
assessment: Intact  Limitations addressed: Pain modulation  Untimed (minutes): 2  Unbillable time (minutes): 10  Total Time: 12       *Indicates exercise, modality, or manual techniques to be initiated when appropriate    Objective Measures:         Strength RUE  Comment: All shoulder measurements are with pain. R Shoulder Flexion: 4/5  R Shoulder ABduction: 4/5  R Shoulder Internal Rotation: 4/5, 4+/5  R Shoulder External Rotation: 4/5  R Elbow Flexion: 4+/5  R Elbow Extension: 4+/5                 AROM RUE (degrees)  R Shoulder Flexion (0-180): 96  R Shoulder Extension (0-45): 32  R Shoulder ABduction (0-180): 65  R Shoulder Int Rotation  (0-70): PSIS  R Shoulder Ext Rotation (0-90): C1              LTG 1 Current Status[de-identified] 8/24/23 Right shoulder flexion 96 deg., Ext 32 deg., Abd 65 deg., IR PSIS, ER C 1  LTG 2 Current Status[de-identified] 8/24/23 Right shoulder flexion & Abduction 4/5, IR 4-4+/5, ER 4/5 pain with all motions  LTG 3 Current Status[de-identified] 8/24/23 Pt states 9/10 constant ache. LTG 4 Current Status[de-identified] 8/24/23 Pt independent with HEP. LTG 5 Current Status[de-identified] 8/24/23 25/80      Assessment: Body Structures, Functions, Activity Limitations Requiring Skilled Therapeutic Intervention: Decreased ADL status, Decreased ROM, Decreased body mechanics, Decreased tolerance to work activity, Decreased strength, Increased pain, Decreased posture  Assessment: at 9/10 but no facial grimace normally associated with this high level of pain. States has trouble donning and doffing shirt and washing hair. No change in work related lifting ability. Going to see Orthopedic Dr.  Treatment Diagnosis: R shoulder pain, reduced ROM, reduced strength, difficulty with ADLs             Post-Pain Assessment:       Pain Rating (0-10 pain scale):   9/10   Location and pain description same as pre-treatment unless indicated.    Action: [] NA   [x] Perform HEP  [] Meds as prescribed  [] Modalities as prescribed   [] Call Physician     GOALS   Patient

## 2023-08-29 ENCOUNTER — OFFICE VISIT (OUTPATIENT)
Dept: ORTHOPEDIC SURGERY | Age: 73
End: 2023-08-29

## 2023-08-29 ENCOUNTER — TELEPHONE (OUTPATIENT)
Dept: ORTHOPEDIC SURGERY | Age: 73
End: 2023-08-29

## 2023-08-29 DIAGNOSIS — T14.8XXA CONTUSION OF BONE: ICD-10-CM

## 2023-08-29 DIAGNOSIS — M67.819 TENDINOSIS OF ROTATOR CUFF: ICD-10-CM

## 2023-08-29 DIAGNOSIS — M25.511 ACUTE PAIN OF RIGHT SHOULDER: Primary | ICD-10-CM

## 2023-08-29 ASSESSMENT — ENCOUNTER SYMPTOMS
EYES NEGATIVE: 1
GASTROINTESTINAL NEGATIVE: 1
ALLERGIC/IMMUNOLOGIC NEGATIVE: 1
RESPIRATORY NEGATIVE: 1

## 2023-08-29 NOTE — PROGRESS NOTES
Orthopedic Surgery and Sports Medicine    Subjective:      Patient ID: Dick Hendrickson is a 75 y.o. female who presents today for:  Chief Complaint   Patient presents with    New Patient     Here for exam for RT shoulder injury/pain, states she fell at work, occurred on 6/20/23, has an MRI in and X-rays in [address] [facility] is a 74-year-old female presents today for evaluation of her right shoulder pain. She injured it at work on 6/20/2023. This is a Worker's Compensation injury. She fell while at work at [employer] She also sustained a laceration and black eye. Since the injury she has had pain in the right shoulder. She does report a previous right shoulder injury many years ago as well. She locates the pain over the lateral portion of the shoulder/upper arm, and biceps. It is worse with certain activities such as reaching overhead and behind the back. Initially she had trouble lifting the arm. This has improved somewhat with physical therapy. She states that she is graduated from physical therapy. She still does not feel like she is fully strong and has not returned to work yet. She denies any neck pain. She denies any numbness or tingling in the right upper extremity. She is taking Tylenol. Previous treatment: Physical therapy, Tylenol  NSAIDs: No  Physical therapy: Yes, duration - 7 visits    Hand Dominance: right  Occupation: Works at [employer]  Workers Compensation:   Have you missed work for this issue? Yes  Is this issue being addressed under a worker's compensation claim?  Yes    Past Medical History:   Diagnosis Date    A-fib ([address])     Chronic diastolic heart failure ([address]) 10/8/2015    CKD (chronic kidney disease), stage III ([address]) 10/8/2015    Coronary artery disease involving native coronary artery of native heart without angina pectoris 10/8/2015    Essential hypertension 10/8/2015    Family history of coronary artery disease 10/8/2015    History of non-ST

## 2023-09-06 ENCOUNTER — HOSPITAL ENCOUNTER (OUTPATIENT)
Dept: PHYSICAL THERAPY | Age: 73
Setting detail: THERAPIES SERIES
Discharge: HOME | End: 2023-09-06
Attending: STUDENT IN AN ORGANIZED HEALTH CARE EDUCATION/TRAINING PROGRAM
Payer: COMMERCIAL

## 2023-09-06 PROCEDURE — 97162 PT EVAL MOD COMPLEX 30 MIN: CPT

## 2023-09-06 PROCEDURE — 97035 APP MDLTY 1+ULTRASOUND EA 15: CPT

## 2023-09-06 ASSESSMENT — PAIN DESCRIPTION - DESCRIPTORS: DESCRIPTORS: ACHING;BURNING

## 2023-09-06 ASSESSMENT — PAIN DESCRIPTION - ORIENTATION: ORIENTATION: RIGHT;ANTERIOR;OUTER

## 2023-09-06 ASSESSMENT — PAIN DESCRIPTION - LOCATION: LOCATION: SHOULDER

## 2023-09-06 ASSESSMENT — PAIN SCALES - GENERAL: PAINLEVEL_OUTOF10: 3

## 2023-09-06 NOTE — PROGRESS NOTES
[address] [facility]  PHYSICAL THERAPY PLAN OF CARE   [address] [facility] Shabnam Burgess, [address] [facility]         Phone: [phone]  Fax: [phone]    [] Certification  [] Recertification [x]  Plan of Care  [] Progress Note [] Discharge      Referring Provider: Neyda Castañeda MD Referring Provider (secondary): Rich Choi. Solmon Lesches, MD   From:  Taryn Sainz, PT, DPT  Patient: Bal Orona (89 y.o. female) : 1950 Date: 2023  Medical Diagnosis: Acute pain of right shoulder [M25.511]  Tendinosis of rotator cuff [M67.819]  Contusion of bone [T14. 8XXA]       Treatment Diagnosis: R shoulder pain, impaired reaching and lifting activity tolerance, R shoulder weakness, decreased R shoulder ROM    Plan of Care/Certification Expiration Date: 10/01/23   Progress Report Period from:  2023  to 2023    Visits to Date: 1 No Show: 0 Cancelled Appts: 0    OBJECTIVE:   Long Term Goals - Time Frame for Long Term Goals : 4-6 weeks  Goals Current/ Discharge status Status   Long Term Goal 1: Pt will resume full AROM of R shoulder symmetrical to LUE to improve reaching in all directions   AROM LUE (degrees)  LUE AROM : WNL  LUE General AROM: 170 elevation; IR reach to bra line; ER reach CT junction       AROM RUE (degrees)  RUE AROM : Exceptions  R Shoulder Flexion (0-180): 95  R Shoulder Extension (0-45): 50  R Shoulder ABduction (0-180): 95  R Shoulder Int Rotation  (0-70): R posterior hip  R Shoulder Ext Rotation (0-90): back of neck with limited shoulder elevation and ER       New   Long Term Goal 2: Pt will demo 5/5 R shoulder girdle and arm strength to resume lifting of up to 40# boxes for work duties at [employer].      Strength RUE  Strength RUE: Exception  Comment: AROM limited by pain and weakness  R Shoulder Flexion: 3+/5  R Shoulder Extension: 3+/5  R Shoulder ABduction: 3+/5  R Shoulder Internal Rotation: 4-/5  R Shoulder External Rotation: 3+/5  R Shoulder Horizontal ABduction: 3+/5  R Shoulder
tablet by mouth nightly, Disp: 90 tablet, Rfl: 3    glucose blood VI test strips (ASCENSIA AUTODISC VI;ONE TOUCH ULTRA TEST VI) strip, TEST BLOOD SUGAR TWICE A DAY, Disp: , Rfl: 3    FREESTYLE LANCETS MISC, USE 3 TIMES DAILY, Disp: , Rfl: 2    lisinopril (PRINIVIL;ZESTRIL) 20 MG tablet, TAKE 1 TABLET BY MOUTH IN THE MORNING AND AT BEDTIME, Disp: 60 tablet, Rfl: 3    carvedilol (COREG) 25 MG tablet, TAKE 1 TABLET BY MOUTH TWICE A DAY - HOLD FOR SBP <100 OR HR <60, Disp: 60 tablet, Rfl: 0    furosemide (LASIX) 40 MG tablet, Take 1 tablet by mouth daily, Disp: 90 tablet, Rfl: 3    aspirin 325 MG EC tablet, Take 1 tablet by mouth daily, Disp: , Rfl:     levothyroxine (SYNTHROID) 175 MCG tablet, Take 1 tablet by mouth daily, Disp: , Rfl:     insulin NPH (HUMULIN N;NOVOLIN N) 100 UNIT/ML injection vial, Inject 25 Units into the skin 2 times daily (before meals), Disp: , Rfl:     nitroGLYCERIN (NITROSTAT) 0.4 MG SL tablet, Place 1 tablet under the tongue every 5 minutes as needed, Disp: , Rfl:   Allergies: Latex, Iodinated contrast media, Pravastatin, and Vytorin [ezetimibe-simvastatin]      Imaging (if applicable): XR SHOULDER RIGHT (MIN 2 VIEWS)    Result Date: 8/29/2023  EXAMINATION: THREE XRAY VIEWS OF THE RIGHT SHOULDER 8/29/2023 10:20 am COMPARISON: None. HISTORY: ORDERING SYSTEM PROVIDED HISTORY: Acute pain of right shoulder TECHNOLOGIST PROVIDED HISTORY: Reason for exam:->shoulder pain What reading provider will be dictating this exam?->CRC FINDINGS: Glenohumeral joint is normally aligned. No evidence of acute fracture or dislocation. No abnormal periarticular calcifications. [facility] joint is unremarkable in appearance. Visualized lung is unremarkable. No acute abnormality. SUBJECTIVE EXAMINATION     History obtained from[de-identified] Patient, Chart Review,      Family/Caregiver Present: No    Subjective History: Onset Date: 06/20/23  Subjective: Pt fell at working, injuring R shoulder on 6/20/23.  Pt completed PT

## 2023-09-13 ENCOUNTER — HOSPITAL ENCOUNTER (OUTPATIENT)
Dept: PHYSICAL THERAPY | Age: 73
Setting detail: THERAPIES SERIES
Discharge: HOME | End: 2023-09-13
Attending: STUDENT IN AN ORGANIZED HEALTH CARE EDUCATION/TRAINING PROGRAM
Payer: COMMERCIAL

## 2023-09-13 PROCEDURE — 97140 MANUAL THERAPY 1/> REGIONS: CPT

## 2023-09-13 PROCEDURE — 97110 THERAPEUTIC EXERCISES: CPT

## 2023-09-13 ASSESSMENT — PAIN DESCRIPTION - LOCATION: LOCATION: SHOULDER

## 2023-09-13 ASSESSMENT — PAIN DESCRIPTION - PAIN TYPE: TYPE: ACUTE PAIN

## 2023-09-13 ASSESSMENT — PAIN DESCRIPTION - DESCRIPTORS: DESCRIPTORS: ACHING

## 2023-09-13 ASSESSMENT — PAIN SCALES - GENERAL: PAINLEVEL_OUTOF10: 4

## 2023-09-13 ASSESSMENT — PAIN DESCRIPTION - ORIENTATION: ORIENTATION: RIGHT;OUTER

## 2023-09-13 NOTE — PROGRESS NOTES
[address] [facility]  Outpatient Physical Therapy    Treatment Note        Date: 2023  Patient: Panfilo Mcdonald  : 1950   Confirmed: Yes  MRN: [id number]  Referring Provider: Konrad Coats MD   Medical Diagnosis: Acute pain of right shoulder [M25.511]  Tendinosis of rotator cuff [M67.819]  Contusion of bone [T14. 8XXA]       Treatment Diagnosis: R shoulder pain, impaired reaching and lifting activity tolerance, R shoulder weakness, decreased R shoulder ROM    Visit Information:  Insurance: Payor: 3-HAB / Plan: 3-HAB / Product Type: *No Product type* /   PT Visit Information  Onset Date: 23  PT Insurance Information: [facility]  Total # of Visits Approved: 12  Total # of Visits to Date: 2  Plan of Care/Certification Expiration Date: 10/01/23  No Show: 0  Canceled Appointment: 0  Progress Note Counter:   Referring Provider (secondary): Noah Coats MD    Subjective Information:  Subjective: Pt states that she has the most pain when laying down to sleep at night but the pain is around a 4-5/10 during the day.   HEP Compliance:  [x] Good [] Fair [] Poor [] Reports not doing due to:    Pain Screening  Patient Currently in Pain: Yes  Pain Assessment: 0-10  Pain Level: 4  Pain Type: Acute pain  Pain Location: Shoulder  Pain Orientation: Right, Outer  Pain Descriptors: Aching    Treatment:  Exercises:  Exercises  Exercise 1: pulley flexion, scaption x3 minutes ea  Exercise 3: S/L ER, ABD 15x2  Exercise 4: Supine cane AAROM flexion, scaption, ER 1x20 1x10  Exercise 5: Supine 90 degrees of flexion cricles CW CWW 15 ea x2  Exercise 6: Wall ball stabs x15  Exercise 20: HEP: S/L ABD and ER       Manual:   Manual Therapy  Soft Tissue Mobilizaton: R UT 10 minutes for decreased pain and tightness       Modalities:  Cryotherapy (CPT 16649)  Patient Position: Seated  Number Minutes Cryotherapy: 10  Cryotherapy location: Right, Shoulder  Limitations addressed: Pain

## 2023-09-19 ENCOUNTER — HOSPITAL ENCOUNTER (OUTPATIENT)
Dept: PHYSICAL THERAPY | Age: 73
Setting detail: THERAPIES SERIES
Discharge: HOME | End: 2023-09-19
Attending: STUDENT IN AN ORGANIZED HEALTH CARE EDUCATION/TRAINING PROGRAM
Payer: COMMERCIAL

## 2023-09-19 PROCEDURE — 97110 THERAPEUTIC EXERCISES: CPT

## 2023-09-19 ASSESSMENT — PAIN DESCRIPTION - ORIENTATION: ORIENTATION: RIGHT;OUTER

## 2023-09-19 ASSESSMENT — PAIN DESCRIPTION - PAIN TYPE: TYPE: ACUTE PAIN

## 2023-09-19 ASSESSMENT — PAIN DESCRIPTION - DESCRIPTORS: DESCRIPTORS: ACHING

## 2023-09-19 ASSESSMENT — PAIN DESCRIPTION - LOCATION: LOCATION: SHOULDER

## 2023-09-19 ASSESSMENT — PAIN SCALES - GENERAL: PAINLEVEL_OUTOF10: 4

## 2023-09-19 NOTE — PROGRESS NOTES
Therapy                            Cancellation/No-show Note      Date: 2023  Patient: Bal Orona (30 y.o. female)  : 1950  MRN:  [id number]  Referring Physician: Neyda Castañeda MD Referring Provider (secondary): Rich Choi. Solmon Lesches, MD  Medical Diagnosis: Acute pain of right shoulder [M25.511]  Tendinosis of rotator cuff [M67.819]  Contusion of bone [T14. 8XXA]      Visit Information:  Visits to Date 2   No Show/Cancelled Appts:  0      For today's appointment patient:  []  Cancelled  []  Rescheduled appointment  [x]  No-show   []  Called pt to remind of next appointment     Reason given by patient:  []  Patient ill  []  Conflicting appointment  []  No transportation    []  Conflict with work  [x]  No reason given  []  Other:      [x] Pt has future appointments scheduled, no follow up needed  [] Pt requests to be on hold.     Reason:   If > 2 weeks please discuss with therapist.  [] Therapist to call pt for follow up     Comments:       Signature: Electronically signed by Jose Palmer PTA on 23 at 1:04 PM EDT
cycles this date and the benefits of movement to help reduce pain. Pt verbalized understanding and reported significant decrease in pain at conclusion of tx 0/10 post exertion this date . PT continues to report 10/10 at night time but now has better tools to manage pain . Assessment: Body Structures, Functions, Activity Limitations Requiring Skilled Therapeutic Intervention: Increased pain, Decreased ROM, Decreased strength, Decreased high-level IADLs  Assessment: Educated pt on pain cycles and pain management techniques during the evening to help alleviate pain through mechanical means Pt inquired about the use of topicals. educated on how topicals help reduce pain with verbalized understanding. introduced new activities this date with fair results Pt reported pain decrease at conclusion of tx. pt requires light increased in VC/TCs for form maintenance. Progress as able. Treatment Diagnosis: R shoulder pain, impaired reaching and lifting activity tolerance, R shoulder weakness, decreased R shoulder ROM  Therapy Prognosis: Good       Patient Education: [x] NA       Post-Pain Assessment:       Pain Rating (0-10 pain scale):  0 /10   Location and pain description same as pre-treatment unless indicated. Action: [] NA   [x] Perform HEP  [] Meds as prescribed  [] Modalities as prescribed   [] Call Physician     GOALS   Patient Goal(s): Patient Goals : regain full use of R arm without pain       Long Term Goals Completed by 4-6 weeks Goal Status   LTG 1 Pt will resume full AROM of R shoulder symmetrical to LUE to improve reaching in all directions In progress   LTG 2 Pt will demo 5/5 R shoulder girdle and arm strength to resume lifting of up to 40# boxes for work duties at [employer].  In progress   LTG 3 Pt will report decreased R shoulder pain to < 2/10 intensity for improved mobility and activity tolerance In progress   LTG 4 Pt will report at least 90% PLOF via subjective report or functional survey In

## 2023-09-21 ENCOUNTER — APPOINTMENT (OUTPATIENT)
Dept: PHYSICAL THERAPY | Age: 73
End: 2023-09-21
Attending: STUDENT IN AN ORGANIZED HEALTH CARE EDUCATION/TRAINING PROGRAM
Payer: COMMERCIAL

## 2023-09-28 ENCOUNTER — HOSPITAL ENCOUNTER (OUTPATIENT)
Dept: PHYSICAL THERAPY | Age: 73
Setting detail: THERAPIES SERIES
Discharge: HOME | End: 2023-09-28
Attending: STUDENT IN AN ORGANIZED HEALTH CARE EDUCATION/TRAINING PROGRAM
Payer: COMMERCIAL

## 2023-09-28 PROCEDURE — 97035 APP MDLTY 1+ULTRASOUND EA 15: CPT

## 2023-09-28 PROCEDURE — 97140 MANUAL THERAPY 1/> REGIONS: CPT

## 2023-09-28 ASSESSMENT — PAIN DESCRIPTION - DESCRIPTORS: DESCRIPTORS: ACHING

## 2023-09-28 ASSESSMENT — PAIN DESCRIPTION - LOCATION: LOCATION: SHOULDER

## 2023-09-28 ASSESSMENT — PAIN SCALES - GENERAL: PAINLEVEL_OUTOF10: 5

## 2023-09-28 ASSESSMENT — PAIN DESCRIPTION - ORIENTATION: ORIENTATION: RIGHT;ANTERIOR

## 2023-09-28 NOTE — PROGRESS NOTES
[address] [facility]  Outpatient Physical Therapy    Treatment Note        Date: 2023  Patient: Asad Burleson  : 1950   Confirmed: Yes  MRN: [id number]  Referring Provider: Patsy Love. Wilma Beyer MD   Medical Diagnosis: Acute pain of right shoulder [M25.511]  Tendinosis of rotator cuff [M67.819]  Contusion of bone [T14. 8XXA]       Treatment Diagnosis: R shoulder pain, impaired reaching and lifting activity tolerance, R shoulder weakness, decreased R shoulder ROM    Visit Information:  Insurance: Payor: 3-HAB / Plan: 3-HAB / Product Type: *No Product type* /   PT Visit Information  Onset Date: 23  PT Insurance Information: [facility]  Total # of Visits Approved: 12  Total # of Visits to Date: 4  Plan of Care/Certification Expiration Date: 10/01/23  No Show: 0  Canceled Appointment: 0  Progress Note Counter:   Referring Provider (secondary): Tiffany Robbins. Wilma Beyer MD    Subjective Information:  Subjective: Pt reports constant right shoulder pain that she reports is more intense at night than during the day time. Pt reports she sleeps on her L side so the pressure is not on her shoulder.   HEP Compliance:  [x] Good [] Fair [] Poor [] Reports not doing due to:    Pain Screening  Patient Currently in Pain: Yes  Pain Assessment: 0-10  Pain Level: 5  Pain Location: Shoulder  Pain Orientation: Right, Anterior  Pain Descriptors: Aching    Treatment:    Manual:   Manual Therapy  Soft Tissue Mobilizaton: R upper arm and shoulder  Other: R shoulder PROM - flex, abd, ER, IR (pt educated to stop pushing ROM exercises into painful end ranges to allow muscle healing and decreased pain)  Gentle GHJ mobs provided during PROM to improve roll/glide into overhead planes  PROM still elicits pain at end ranges of ER, IR, and elevation       Modalities:  Ultrasound (CPT K1310530)  Patient Position: Seated  Ultrasound location: Right, Shoulder  Ultrasound specified location: anterior/lateral

## 2023-09-29 ENCOUNTER — APPOINTMENT (OUTPATIENT)
Dept: PHYSICAL THERAPY | Age: 73
End: 2023-09-29
Attending: STUDENT IN AN ORGANIZED HEALTH CARE EDUCATION/TRAINING PROGRAM
Payer: COMMERCIAL

## 2023-10-02 ENCOUNTER — HOSPITAL ENCOUNTER (OUTPATIENT)
Dept: PHYSICAL THERAPY | Age: 73
Setting detail: THERAPIES SERIES
Discharge: HOME | End: 2023-10-02
Attending: STUDENT IN AN ORGANIZED HEALTH CARE EDUCATION/TRAINING PROGRAM
Payer: COMMERCIAL

## 2023-10-02 PROCEDURE — 97110 THERAPEUTIC EXERCISES: CPT

## 2023-10-02 ASSESSMENT — PAIN DESCRIPTION - DESCRIPTORS: DESCRIPTORS: ACHING

## 2023-10-02 ASSESSMENT — PAIN SCALES - GENERAL: PAINLEVEL_OUTOF10: 5

## 2023-10-02 ASSESSMENT — PAIN DESCRIPTION - PAIN TYPE: TYPE: ACUTE PAIN

## 2023-10-02 ASSESSMENT — PAIN DESCRIPTION - LOCATION: LOCATION: SHOULDER

## 2023-10-02 ASSESSMENT — PAIN DESCRIPTION - ORIENTATION: ORIENTATION: RIGHT;ANTERIOR

## 2023-10-02 NOTE — PROGRESS NOTES
Intervention: Increased pain, Decreased ROM, Decreased strength, Decreased high-level IADLs  Assessment: Focused on mobility and stability through stabilization exercises and AAROM exercises. Pt expressed constant low pain with all motions even at rest. Pt advised to keep motions within a pain free range with fair carryover. Pt expressed various times throughout session that she did not want ultrasound or PROM to be performed on her shoulder. Treatment Diagnosis: R shoulder pain, impaired reaching and lifting activity tolerance, R shoulder weakness, decreased R shoulder ROM  Therapy Prognosis: Good       Patient Education: [] NA   Anatomy of condition, ice vs heat    Post-Pain Assessment:       Pain Rating (0-10 pain scale):   same/10   Location and pain description same as pre-treatment unless indicated. Action: [] NA   [x] Perform HEP  [] Meds as prescribed  [] Modalities as prescribed   [] Call Physician     GOALS   Patient Goal(s): Patient Goals : regain full use of R arm without pain      Long Term Goals Completed by 4-6 weeks Goal Status   LTG 1 Pt will resume full AROM of R shoulder symmetrical to LUE to improve reaching in all directions In progress   LTG 2 Pt will demo 5/5 R shoulder girdle and arm strength to resume lifting of up to 40# boxes for work duties at [employer].  In progress   LTG 3 Pt will report decreased R shoulder pain to < 2/10 intensity for improved mobility and activity tolerance In progress   LTG 4 Pt will report at least 90% PLOF via subjective report or functional survey In progress   LTG 5 Pt will be independent with HEP and therapeutic pain mgmt techniques In progress          Plan:  Frequency/Duration:  Plan  Plan Frequency: 2  Plan weeks: 4-6  Current Treatment Recommendations: ROM, Strengthening, Manual, Modalities, Pain management, Return to work related activity, Home exercise program, Patient/Caregiver education & training, Dry needling, Therapeutic activities  Modalities:

## 2023-10-06 ENCOUNTER — APPOINTMENT (OUTPATIENT)
Dept: PHYSICAL THERAPY | Age: 73
End: 2023-10-06
Attending: STUDENT IN AN ORGANIZED HEALTH CARE EDUCATION/TRAINING PROGRAM
Payer: COMMERCIAL

## 2023-10-12 ENCOUNTER — HOSPITAL ENCOUNTER (OUTPATIENT)
Dept: PHYSICAL THERAPY | Age: 73
Setting detail: THERAPIES SERIES
Discharge: HOME | End: 2023-10-12
Attending: STUDENT IN AN ORGANIZED HEALTH CARE EDUCATION/TRAINING PROGRAM
Payer: COMMERCIAL

## 2023-10-12 PROCEDURE — 97110 THERAPEUTIC EXERCISES: CPT

## 2023-10-12 ASSESSMENT — PAIN SCALES - GENERAL: PAINLEVEL_OUTOF10: 5

## 2023-10-12 ASSESSMENT — PAIN DESCRIPTION - LOCATION: LOCATION: SHOULDER

## 2023-10-12 ASSESSMENT — PAIN DESCRIPTION - ORIENTATION: ORIENTATION: RIGHT

## 2023-10-12 ASSESSMENT — PAIN DESCRIPTION - DESCRIPTORS: DESCRIPTORS: SORE

## 2023-10-12 NOTE — PROGRESS NOTES
[address] [facility]  Outpatient Physical Therapy    Treatment Note        Date: 10/12/2023  Patient: Barbara Arvizu  : 1950   Confirmed: Yes  MRN: [id number]  Referring Provider: Amaris Greene. Ethan Wilkerson MD   Medical Diagnosis: Acute pain of right shoulder [M25.511]  Tendinosis of rotator cuff [M67.819]  Contusion of bone [T14. 8XXA]       Treatment Diagnosis: R shoulder pain, impaired reaching and lifting activity tolerance, R shoulder weakness, decreased R shoulder ROM    Visit Information:  Insurance: Payor: 3-HAB / Plan: 3-HAB / Product Type: *No Product type* /   PT Visit Information  Onset Date: 23  PT Insurance Information: [facility]  Total # of Visits Approved: 12  Total # of Visits to Date: 6  Plan of Care/Certification Expiration Date: 10/01/23  No Show: 0  Canceled Appointment: 0  Progress Note Counter:   Referring Provider (secondary): Raeann Roberts. Ethan Wilkerson MD    Subjective Information:  Subjective: Pt reports ongoing pain in R shoulder that comes and goes, mainly in the morning. \"The extreme pain seems like it is fading away\". Pt reports she was able to sleep through full night the last 4 consecutive nights and complete various home tasks such as dishes, light cleaning, grooming her hair, and light lifting.   HEP Compliance:  [x] Good [] Fair [] Poor [] Reports not doing due to:    Pain Screening  Patient Currently in Pain: Yes  Pain Assessment: 0-10  Pain Level: 5  Pain Location: Shoulder  Pain Orientation: Right  Pain Descriptors: Sore    Treatment:  Exercises:  Exercises  Exercise 1: pulley flexion, scaption x3 minutes   Exercise 2: Finger ladder flexion/ scaption: reaches level 16 @ 115-120 deg ROM - x 10 reps  Exercise 3: RUE wall slide: x 10, Vince UE wall slide x 10 (noted increased in R upper trap tension vs L upper trap)  Exercise 4: reviewed R upper trap and levator stetches; STM provided to both muscles for 5 minutes to relieve muscle tension  ROM and

## 2023-10-13 ENCOUNTER — APPOINTMENT (OUTPATIENT)
Dept: PHYSICAL THERAPY | Age: 73
End: 2023-10-13
Attending: STUDENT IN AN ORGANIZED HEALTH CARE EDUCATION/TRAINING PROGRAM
Payer: COMMERCIAL

## 2023-10-17 ENCOUNTER — HOSPITAL ENCOUNTER (OUTPATIENT)
Dept: PHYSICAL THERAPY | Age: 73
Setting detail: THERAPIES SERIES
Discharge: HOME | End: 2023-10-17
Attending: STUDENT IN AN ORGANIZED HEALTH CARE EDUCATION/TRAINING PROGRAM
Payer: COMMERCIAL

## 2023-10-17 PROCEDURE — 97110 THERAPEUTIC EXERCISES: CPT

## 2023-10-17 ASSESSMENT — PAIN SCALES - GENERAL: PAINLEVEL_OUTOF10: 3

## 2023-10-17 ASSESSMENT — PAIN DESCRIPTION - ORIENTATION: ORIENTATION: RIGHT;UPPER

## 2023-10-17 ASSESSMENT — PAIN DESCRIPTION - DESCRIPTORS: DESCRIPTORS: DULL;ACHING

## 2023-10-17 ASSESSMENT — PAIN DESCRIPTION - LOCATION: LOCATION: ARM

## 2023-10-17 NOTE — PROGRESS NOTES
Plan:  Frequency/Duration:  Plan  Plan Frequency: 2  Plan weeks: 4-6  Current Treatment Recommendations: ROM, Strengthening, Manual, Modalities, Pain management, Return to work related activity, Home exercise program, Patient/Caregiver education & training, Dry needling, Therapeutic activities  Modalities: Heat/Cold, Ultrasound, E-stim - unattended  Pt to continue current HEP. See objective section for any therapeutic exercise changes, additions or modifications this date.     Therapy Time:      PT Individual Minutes  Time In: 1335  Time Out: 1428  Minutes: 53  Timed Code Treatment Minutes: 42 Minutes  Procedure Minutes: 10 min CP    Modality Time In Time Out Total Minutes Units    Ther ex (43670) 3314 4742 42 3                 Cold Pack (61960) 5404 2553 10 0            Electronically signed by Phyllis Escudero PT on 10/17/2023 at 2:24 PM

## 2023-10-31 ENCOUNTER — HOSPITAL ENCOUNTER (OUTPATIENT)
Dept: PHYSICAL THERAPY | Age: 73
Setting detail: THERAPIES SERIES
Discharge: HOME | End: 2023-10-31
Attending: STUDENT IN AN ORGANIZED HEALTH CARE EDUCATION/TRAINING PROGRAM
Payer: COMMERCIAL

## 2023-10-31 PROCEDURE — 97112 NEUROMUSCULAR REEDUCATION: CPT

## 2023-10-31 PROCEDURE — 97140 MANUAL THERAPY 1/> REGIONS: CPT

## 2023-10-31 ASSESSMENT — PAIN DESCRIPTION - ORIENTATION: ORIENTATION: RIGHT;UPPER

## 2023-10-31 ASSESSMENT — PAIN DESCRIPTION - PAIN TYPE: TYPE: CHRONIC PAIN

## 2023-10-31 ASSESSMENT — PAIN SCALES - GENERAL: PAINLEVEL_OUTOF10: 3

## 2023-10-31 ASSESSMENT — PAIN DESCRIPTION - LOCATION: LOCATION: SHOULDER;ARM

## 2023-10-31 NOTE — PROGRESS NOTES
1335  Minutes: 45  Timed Code Treatment Minutes: 45 Minutes  Procedure Minutes: 0    Modality Time In Time Out Total Minutes Units    Manual Therapy (47506) 8476 1300 10 1   Neuro re-ed (89031) 7000 133 35 2     Electronically signed by Hetal Crockett PT on 10/31/23 at 1:52 PM EDT

## 2023-11-14 ENCOUNTER — HOSPITAL ENCOUNTER (OUTPATIENT)
Dept: PHYSICAL THERAPY | Age: 73
Setting detail: THERAPIES SERIES
Discharge: HOME | End: 2023-11-14
Attending: STUDENT IN AN ORGANIZED HEALTH CARE EDUCATION/TRAINING PROGRAM
Payer: COMMERCIAL

## 2023-11-14 PROCEDURE — 97110 THERAPEUTIC EXERCISES: CPT

## 2023-11-14 ASSESSMENT — PAIN DESCRIPTION - DESCRIPTORS: DESCRIPTORS: ACHING

## 2023-11-14 ASSESSMENT — PAIN DESCRIPTION - LOCATION: LOCATION: SHOULDER

## 2023-11-14 ASSESSMENT — PAIN DESCRIPTION - ORIENTATION: ORIENTATION: RIGHT

## 2023-11-14 ASSESSMENT — PAIN SCALES - GENERAL: PAINLEVEL_OUTOF10: 7

## 2023-11-14 NOTE — PROGRESS NOTES
[facility]  Outpatient Physical Therapy   Treatment Note        Date: 2023  Patient: Hayden Perez  : 1950   Confirmed: Yes  MRN: [id number]  Referring Provider: Marline Scott MD  [address] [facility] Alice Parrish MD   Medical Diagnosis: Acute pain of right shoulder [M25.511]  Tendinosis of rotator cuff [M67.819]  Contusion of bone [T14. 8XXA]      Treatment Diagnosis: R shoulder pain, impaired reaching and lifting activity tolerance, R shoulder weakness, decreased R shoulder ROM    Visit Information:  Insurance: Payor: 3-HAB / Plan: 3-HAB / Product Type: *No Product type* /   PT Visit Information  Onset Date: 23  PT Insurance Information: [facility]  Total # of Visits Approved: 12  Total # of Visits to Date: 9  Plan of Care/Certification Expiration Date: 23  No Show: 0  Canceled Appointment: 0  Progress Note Counter:   Referring Provider (secondary): Yelena Meyer. Alice Parrish MD    Subjective Information:  Subjective: Pt reports 6-7/10 pain, worse in AM. Comes and goes. Gets worse middle of the night and with reaching. HEP Compliance:  [x] Good [] Fair [] Poor [] Reports not doing due to:               Pain Screening  Patient Currently in Pain: Yes  Pain Level: 7  Pain Location: Shoulder  Pain Orientation: Right  Pain Descriptors: Aching    Treatment:  Exercises:  Exercises  Exercise 1: pulley flexion, scaption x3 minutes ea  Exercise 2: Finger ladder flexion 24/ scaption 22  x 10 reps  Exercise 3: Shoulder T-band: IR and ER, YTB x 20ea (in neutral)  Exercise 14: UBE fwd/retro L1.0 x4 min total  Exercise 15: 4-way ball on wall x30 each, RUE  Exercise 20: HEP: cont current + shrugs and rev. shrugs.        Modalities:  Cryotherapy (CPT 23405)  Patient Position: Seated  Number Minutes Cryotherapy: 10  Cryotherapy location: Right, Shoulder  Post treatment skin assessment: Intact  Limitations addressed: Pain modulation       *Indicates exercise, modality, or manual techniques to be initiated when

## 2023-11-17 ENCOUNTER — HOSPITAL ENCOUNTER (OUTPATIENT)
Dept: PHYSICAL THERAPY | Age: 73
Setting detail: THERAPIES SERIES
Discharge: HOME | End: 2023-11-17
Attending: STUDENT IN AN ORGANIZED HEALTH CARE EDUCATION/TRAINING PROGRAM
Payer: COMMERCIAL

## 2023-11-17 NOTE — PROGRESS NOTES
Therapy                            Cancellation/No-show Note    Date: 2023  Patient: Jannie Juarez (66 y.o. female)  : 1950  MRN:  [id number]  Referring Physician: Ting Maciel MD Referring Provider (secondary): Lefty Banks. Humza Hernandez MD  Medical Diagnosis: Acute pain of right shoulder [M25.511]  Tendinosis of rotator cuff [M67.819]  Contusion of bone [T14. 8XXA]      Visit Information:  Insurance: Payor: 3-HAB / Plan: 3-HAB / Product Type: *No Product type* /   Visits to Date: 9   No Show/Cancelled Appts: 0 / 0      For today's appointment patient:  [x]  Cancelled  []  Rescheduled appointment  []  No-show   []  Called pt to remind of next appointment     Reason given by patient:  []  Patient ill  []  Conflicting appointment  [x]  No transportation    []  Conflict with work  []  No reason given  []  Other:      [x] Pt has future appointments scheduled, no follow up needed  [] Pt requests to be on hold.     Reason:   If > 2 weeks please discuss with therapist.  [] Therapist to call pt for follow up     Comments:       Signature: Electronically signed by Irene Mahan PTA on 23 at 12:43 PM EST

## 2023-11-20 ENCOUNTER — HOSPITAL ENCOUNTER (OUTPATIENT)
Dept: PHYSICAL THERAPY | Age: 73
Setting detail: THERAPIES SERIES
Discharge: HOME | End: 2023-11-20
Attending: STUDENT IN AN ORGANIZED HEALTH CARE EDUCATION/TRAINING PROGRAM
Payer: COMMERCIAL

## 2023-11-20 NOTE — PROGRESS NOTES
Therapy                            Cancellation/No-show Note    Date: 2023  Patient: Jackson Lynn (42 y.o. female)  : 1950  MRN:  [id number]  Referring Physician: Sugar Cruz MD Referring Provider (secondary): Melnay Santillan MD  Medical Diagnosis: Acute pain of right shoulder [M25.511]  Tendinosis of rotator cuff [M67.819]  Contusion of bone [T14. 8XXA]      Visit Information:  Insurance: Payor: 3-HAB / Plan: 3-HAB / Product Type: *No Product type* /   Visits to Date: 9   No Show/Cancelled Appts: 0 / 2      For today's appointment patient:  [x]  Cancelled  []  Rescheduled appointment  []  No-show   []  Called pt to remind of next appointment     Reason given by patient:  []  Patient ill  []  Conflicting appointment  [x]  No transportation    []  Conflict with work  []  No reason given  []  Other:      [x] Pt has future appointments scheduled, no follow up needed  [] Pt requests to be on hold.     Reason:   If > 2 weeks please discuss with therapist.  [] Therapist to call pt for follow up     Comments:       Signature: Electronically signed by Aldon Bernheim, PT on 23 at 5:28 PM EST

## 2023-11-22 ENCOUNTER — HOSPITAL ENCOUNTER (OUTPATIENT)
Dept: PHYSICAL THERAPY | Age: 73
Setting detail: THERAPIES SERIES
Discharge: HOME | End: 2023-11-22
Attending: STUDENT IN AN ORGANIZED HEALTH CARE EDUCATION/TRAINING PROGRAM
Payer: COMMERCIAL

## 2023-11-22 PROCEDURE — 97110 THERAPEUTIC EXERCISES: CPT

## 2023-11-22 ASSESSMENT — PAIN DESCRIPTION - DESCRIPTORS: DESCRIPTORS: ACHING

## 2023-11-22 ASSESSMENT — PAIN SCALES - GENERAL: PAINLEVEL_OUTOF10: 6

## 2023-11-22 ASSESSMENT — PAIN DESCRIPTION - LOCATION: LOCATION: SHOULDER

## 2023-11-22 ASSESSMENT — PAIN DESCRIPTION - ORIENTATION: ORIENTATION: RIGHT

## 2023-11-22 ASSESSMENT — PAIN DESCRIPTION - PAIN TYPE: TYPE: CHRONIC PAIN

## 2023-11-22 NOTE — PROGRESS NOTES
[facility]  Outpatient Physical Therapy   Treatment Note        Date: 2023  Patient: Bal Orona  : 1950   Confirmed: Yes  MRN: [id number]  Referring Provider: Neyda Castañeda MD  [facility] Solmon Lesches, MD   Medical Diagnosis: Acute pain of right shoulder [M25.511]  Tendinosis of rotator cuff [M67.819]  Contusion of bone [T14. 8XXA]      Treatment Diagnosis: R shoulder pain, impaired reaching and lifting activity tolerance, R shoulder weakness, decreased R shoulder ROM    Visit Information:  Insurance: Payor: 3-HAB / Plan: 3-HAB / Product Type: *No Product type* /   PT Visit Information  Onset Date: 23  PT Insurance Information: [facility]  Total # of Visits Approved: 12  Total # of Visits to Date: 10  Plan of Care/Certification Expiration Date: 23  No Show: 0  Canceled Appointment: 2  Progress Note Counter: 10/12  Referring Provider (secondary): Rich Choi. Solmon Lesches, MD    Subjective Information:  Subjective: Reports she transferred to [place] due to needing to take bus to get to therapy due to transportation voucher running out. Reports she goes to the doctor on monday. States her shoulder pain has calmed down from a 10 this morning. Reports only thing that helps to reduce pain is ice and heat. reports she wants to finish out her last 2 therapy visits. HEP Compliance:  [x] Good [] Fair [] Poor [] Reports not doing due to:    Pain Screening  Patient Currently in Pain: Yes  Pain Level: 6  Pain Type: Chronic pain  Pain Location: Shoulder  Pain Orientation: Right  Pain Descriptors: Aching    Treatment:  Exercises:  Exercises  Exercise 5: 3-way bicep curl YTB x15 reps, each  Exercise 7: supine chest pulls 2 sets x YTB x 10 reps  Exercise 8: Supine serratus punches 2# x 2 sets x 10 reps  Exercise 9: S/L shoulder abd to 90deg 10 reps x 2# ; ER x10 reps x 2#  Exercise 11: supine PNF D2 YTB x 2sets x 10 reps, each  Exercise 12: supine shoulder flexion to 90 deg x 10 reps x 2#  Exercise 14:  UBE

## 2023-11-22 NOTE — PLAN OF CARE
[id number] [facility]     Ph: [phone]  Fax: [phone]    [] Certification  [] Recertification []  Plan of Care  [x] Progress Note [] Discharge      Referring Provider: Juan Garibay MD Referring Provider (secondary): Jaswinder Batista MD   From:  Soniya Johnson PT    Patient: Dawna Dhillon (51 y.o. female) : 1950 Date: 2023   Medical Diagnosis: Acute pain of right shoulder [M25.511]  Tendinosis of rotator cuff [M67.819]  Contusion of bone [T14. 8XXA]    Treatment Diagnosis: R shoulder pain, impaired reaching and lifting activity tolerance, R shoulder weakness, decreased R shoulder ROM    Plan of Care/Certification Expiration Date: 23   Progress Report Period from: 2023  to 2023    Visits to Date: 10 No Show: 0 Cancelled Appts: 2    OBJECTIVE:   Long Term Goals - Time Frame for Long Term Goals : 4-6 weeks  Goals Current/ Discharge status Status   Long Term Goal 1: Pt will resume full AROM of R shoulder symmetrical to LUE to improve reaching in all directions LTG 1 Current Status[de-identified] : RIGHT: flexion: 98 deg , abduction: 95 deg, extension: 35 deg, IR: PSIS, ER: C7; LEFT: flexion/abduction: 130 deg, extension: 55 deg, IR: T8, ER: C7-T1   In progress   Long Term Goal 2: Pt will demo 5/5 R shoulder girdle and arm strength to resume lifting of up to 40# boxes for work duties at [employer].  LTG 2 Current Status[de-identified] : R shoulder flexion & abduction 3+ -4-/5, IR/ER: 4/5, shoulder flex/ext: 4/5   In progress   Long Term Goal 3: Pt will report decreased R shoulder pain to < 2/10 intensity for improved mobility and activity tolerance LTG 3 Current Status[de-identified] : reports avg pain of 6/10   In progress   Long Term Goal 4: Pt will report at least 90% PLOF via subjective report or functional survey LTG 4 Current Status[de-identified] : 50% secondary to pain   In progress   Long Term Goal 5:

## 2023-11-24 ENCOUNTER — APPOINTMENT (OUTPATIENT)
Dept: PHYSICAL THERAPY | Age: 73
End: 2023-11-24
Attending: STUDENT IN AN ORGANIZED HEALTH CARE EDUCATION/TRAINING PROGRAM
Payer: COMMERCIAL

## 2023-11-27 ENCOUNTER — OFFICE VISIT (OUTPATIENT)
Dept: ORTHOPEDIC SURGERY | Age: 73
End: 2023-11-27

## 2023-11-27 DIAGNOSIS — M67.819 TENDINOSIS OF ROTATOR CUFF: Primary | ICD-10-CM

## 2023-11-27 RX ORDER — TRIAMCINOLONE ACETONIDE 40 MG/ML
40 INJECTION, SUSPENSION INTRA-ARTICULAR; INTRAMUSCULAR ONCE
Status: COMPLETED | OUTPATIENT
Start: 2023-11-27 | End: 2023-11-27

## 2023-11-27 RX ORDER — LIDOCAINE HYDROCHLORIDE 10 MG/ML
5 INJECTION, SOLUTION INFILTRATION; PERINEURAL ONCE
Status: COMPLETED | OUTPATIENT
Start: 2023-11-27 | End: 2023-11-27

## 2023-11-27 RX ADMIN — TRIAMCINOLONE ACETONIDE 40 MG: 40 INJECTION, SUSPENSION INTRA-ARTICULAR; INTRAMUSCULAR at 14:45

## 2023-11-27 RX ADMIN — LIDOCAINE HYDROCHLORIDE 5 ML: 10 INJECTION, SOLUTION INFILTRATION; PERINEURAL at 14:44

## 2023-11-27 ASSESSMENT — ENCOUNTER SYMPTOMS
RESPIRATORY NEGATIVE: 1
GASTROINTESTINAL NEGATIVE: 1
EYES NEGATIVE: 1
ALLERGIC/IMMUNOLOGIC NEGATIVE: 1

## 2023-11-27 NOTE — PROGRESS NOTES
DAILY 90 tablet 2    simvastatin (ZOCOR) 40 MG tablet Take 1 tablet by mouth nightly 90 tablet 3    glucose blood VI test strips (ASCENSIA AUTODISC VI;ONE TOUCH ULTRA TEST VI) strip TEST BLOOD SUGAR TWICE A DAY  3    FREESTYLE LANCETS MISC USE 3 TIMES DAILY  2    lisinopril (PRINIVIL;ZESTRIL) 20 MG tablet TAKE 1 TABLET BY MOUTH IN THE MORNING AND AT BEDTIME 60 tablet 3    carvedilol (COREG) 25 MG tablet TAKE 1 TABLET BY MOUTH TWICE A DAY - HOLD FOR SBP <100 OR HR <60 60 tablet 0    furosemide (LASIX) 40 MG tablet Take 1 tablet by mouth daily 90 tablet 3    aspirin 325 MG EC tablet Take 1 tablet by mouth daily      levothyroxine (SYNTHROID) 175 MCG tablet Take 1 tablet by mouth daily      insulin NPH (HUMULIN N;NOVOLIN N) 100 UNIT/ML injection vial Inject 25 Units into the skin 2 times daily (before meals)      nitroGLYCERIN (NITROSTAT) 0.4 MG SL tablet Place 1 tablet under the tongue every 5 minutes as needed       No current facility-administered medications on file prior to visit. Review of Systems   Constitutional: Negative. HENT: Negative. Eyes: Negative. Respiratory: Negative. Cardiovascular: Negative. Gastrointestinal: Negative. Endocrine: Negative. Genitourinary: Negative. Musculoskeletal:  Positive for arthralgias. Skin: Negative. Allergic/Immunologic: Negative. Neurological: Negative. Hematological: Negative. Psychiatric/Behavioral: Negative. Objective: There were no vitals taken for this visit. Physical Exam:  Ortho Exam    Physical exam of the cervical spine:  full ROM and no palpable tenderness  Negative radiculopathy. Physical exam of the right shoulder:  Skin intact. No redness or warmth. No signs of infection. There is not swelling and deformity of the shoulder girdle. There is not a marcia sign. There is not muscular atrophy present.    Active and passive range of motion of the shoulder is not equal.    Active range of motion of

## 2023-11-29 ENCOUNTER — HOSPITAL ENCOUNTER (OUTPATIENT)
Dept: PHYSICAL THERAPY | Age: 73
Setting detail: THERAPIES SERIES
Discharge: HOME | End: 2023-11-29
Attending: STUDENT IN AN ORGANIZED HEALTH CARE EDUCATION/TRAINING PROGRAM
Payer: COMMERCIAL

## 2023-11-29 PROCEDURE — 97110 THERAPEUTIC EXERCISES: CPT

## 2023-11-29 ASSESSMENT — PAIN SCALES - GENERAL: PAINLEVEL_OUTOF10: 2

## 2023-11-29 ASSESSMENT — PAIN DESCRIPTION - LOCATION: LOCATION: SHOULDER

## 2023-11-29 ASSESSMENT — PAIN DESCRIPTION - ORIENTATION: ORIENTATION: RIGHT

## 2023-11-29 ASSESSMENT — PAIN DESCRIPTION - DESCRIPTORS: DESCRIPTORS: SORE

## 2023-11-29 NOTE — PROGRESS NOTES
[facility]  Outpatient Physical Therapy   Treatment Note        Date: 2023  Patient: Panfilo Mcdonald  : 1950   Confirmed: Yes  MRN: [id number]  Referring Provider: Joel Moreira MD  [facility] Adriana Coats MD   Medical Diagnosis: Acute pain of right shoulder [M25.511]  Tendinosis of rotator cuff [M67.819]  Contusion of bone [T14. 8XXA]      Treatment Diagnosis: R shoulder pain, impaired reaching and lifting activity tolerance, R shoulder weakness, decreased R shoulder ROM    Visit Information:  Insurance: Payor: 3-HAB / Plan: 3-HAB / Product Type: *No Product type* /   PT Visit Information  Onset Date: 23  PT Insurance Information: [facility]  Total # of Visits Approved: 12  Total # of Visits to Date: 6  Plan of Care/Certification Expiration Date: 23  No Show: 0  Canceled Appointment: 2  Progress Note Counter:   Referring Provider (secondary): Noah Madison. Adriana Coats MD    Subjective Information:  Subjective: Pt reports1-2/10 pain \" finally got the shot. \" Plans on discharge [place].   HEP Compliance:  [] Good [x] Fair [] Poor [] Reports not doing due to:               Pain Screening  Patient Currently in Pain: Yes  Pain Level: 2  Pain Location: Shoulder  Pain Orientation: Right  Pain Descriptors: Sore    Treatment:  Exercises:  Exercises  Exercise 1: pulley flexion, scaption x3 minutes ea  Exercise 2: Finger ladder flexion 24/ scaption 22  x 10 reps  Exercise 5: 3-way bicep curl YTB x20 reps, each  Exercise 8: Supine serratus punches 2# x 2 sets x 15 reps  Exercise 9: shoulder abd to 90deg 15 reps x 2# ;  Exercise 11: supine PNF D2 YTB x 2sets x 15 reps, each  Exercise 14: UBE fwd/retro L1.5 x4 min total  Exercise 15: 4-way ball on wall x30 each, RUE       Modalities:  Cryotherapy (CPT 23060)  Patient Position: Seated  Number Minutes Cryotherapy: 10  Cryotherapy location: Right, Shoulder  Post treatment skin assessment: Intact  Limitations addressed: Pain modulation       *Indicates exercise,

## 2023-12-01 ENCOUNTER — HOSPITAL ENCOUNTER (OUTPATIENT)
Dept: PHYSICAL THERAPY | Age: 73
Setting detail: THERAPIES SERIES
Discharge: HOME | End: 2023-12-01
Attending: STUDENT IN AN ORGANIZED HEALTH CARE EDUCATION/TRAINING PROGRAM
Payer: COMMERCIAL

## 2023-12-01 ENCOUNTER — APPOINTMENT (OUTPATIENT)
Dept: PHYSICAL THERAPY | Age: 73
End: 2023-12-01
Attending: STUDENT IN AN ORGANIZED HEALTH CARE EDUCATION/TRAINING PROGRAM
Payer: COMMERCIAL

## 2023-12-01 PROCEDURE — 97110 THERAPEUTIC EXERCISES: CPT

## 2023-12-01 NOTE — PROGRESS NOTES
[id number] [facility]     Ph: [phone]  Fax: [phone]      [] Certification  [] Recertification []  Plan of Care  [] Progress Note [x] Discharge      Referring Provider: Wesley Mera MD Referring Provider (secondary): Jessica Becerra. Hayden Sanches MD   From:  Rosaura Hill PTA  Patient: Roosevelt Gilmore (7 y.o. female) : 1950 Date: 2023  Medical Diagnosis: Acute pain of right shoulder [M25.511]  Tendinosis of rotator cuff [M67.819]  Contusion of bone [T14. 8XXA]       Treatment Diagnosis: R shoulder pain, impaired reaching and lifting activity tolerance, R shoulder weakness, decreased R shoulder ROM    Plan of Care/Certification Expiration Date: : 23   Progress Report Period from: 2023 to 2023    Visits to Date: 12 No Show: 0 Cancelled Appts: 2    OBJECTIVE:     Long Term Goals - Time Frame for Long Term Goals : 4-6 weeks  Goals Current/ Discharge status Status   Long Term Goal 1: Pt will resume full AROM of R shoulder symmetrical to LUE to improve reaching in all directions      AROM RUE (degrees)  R Shoulder Flexion (0-180): 115  R Shoulder ABduction (0-180): 65  R Shoulder Int Rotation  (0-70): R glut  R Shoulder Ext Rotation (0-90): C7  Not Met   Long Term Goal 2: Pt will demo 5/5 R shoulder girdle and arm strength to resume lifting of up to 40# boxes for work duties at [employer].        Strength RUE  R Shoulder Flexion: 4/5  R Shoulder Extension: 3+/5  R Shoulder ABduction: 4-/5  R Shoulder Internal Rotation: 4/5  R Shoulder External Rotation: 4/5  R Elbow Flexion: 4/5  R Elbow Extension: 4/5        Not Met   Long Term Goal 3: Pt will report decreased R shoulder pain to < 2/10 intensity for improved mobility and activity tolerance 0-2/10  Met   Long Term Goal 4: Pt will report at least 90% PLOF via subjective report or functional survey 66% - Patient self reports 90 percent
appropriate    Objective Measures:     Strength: [] NT  [] MMT completed:        Strength RUE  R Shoulder Flexion: 4/5  R Shoulder Extension: 3+/5  R Shoulder ABduction: 4-/5  R Shoulder Internal Rotation: 4/5  R Shoulder External Rotation: 4/5  R Elbow Flexion: 4/5  R Elbow Extension: 4/5    ROM: [] NT  [] ROM measurements:    AROM RUE (degrees)  R Shoulder Flexion (0-180): 115  R Shoulder ABduction (0-180): 65  R Shoulder Int Rotation  (0-70): R glut  R Shoulder Ext Rotation (0-90): C7     Assessment: Body Structures, Functions, Activity Limitations Requiring Skilled Therapeutic Intervention: Increased pain, Decreased ROM, Decreased strength, Decreased high-level IADLs  Assessment: Patient reports significant pain relief following right shoulder injection. States she has returned to most of her household chores and daily activities. Continues to demonstrate restricted right shoulder AROM and strength. Patient feels confident to continues HEP independently. D/C this date. Treatment Diagnosis: R shoulder pain, impaired reaching and lifting activity tolerance, R shoulder weakness, decreased R shoulder ROM  Therapy Prognosis: Good    Post-Pain Assessment:       Pain Rating (0-10 pain scale):  1-2 /10   Location and pain description same as pre-treatment unless indicated. Action: [x] NA   [] Perform HEP  [] Meds as prescribed  [] Modalities as prescribed   [] Call Physician     GOALS   Patient Goal(s): Patient Goals : regain full use of R arm without pain    Long Term Goals Completed by 4-6 weeks Goal Status   LTG 1 Pt will resume full AROM of R shoulder symmetrical to LUE to improve reaching in all directions Not Met   LTG 2 Pt will demo 5/5 R shoulder girdle and arm strength to resume lifting of up to 40# boxes for work duties at [employer].  Not Met   LTG 3 Pt will report decreased R shoulder pain to < 2/10 intensity for improved mobility and activity tolerance Met   LTG 4 Pt will report at least 90% PLOF via

## 2024-01-01 ENCOUNTER — APPOINTMENT (OUTPATIENT)
Dept: GENERAL RADIOLOGY | Age: 74
DRG: 314 | End: 2024-01-01
Payer: MEDICARE

## 2024-01-01 ENCOUNTER — APPOINTMENT (OUTPATIENT)
Dept: CT IMAGING | Age: 74
DRG: 314 | End: 2024-01-01
Payer: MEDICARE

## 2024-01-01 ENCOUNTER — HOSPITAL ENCOUNTER (INPATIENT)
Age: 74
LOS: 1 days | DRG: 314 | End: 2024-04-15
Attending: EMERGENCY MEDICINE | Admitting: INTERNAL MEDICINE
Payer: MEDICARE

## 2024-01-01 VITALS
OXYGEN SATURATION: 100 % | RESPIRATION RATE: 16 BRPM | DIASTOLIC BLOOD PRESSURE: 91 MMHG | TEMPERATURE: 97.5 F | HEART RATE: 115 BPM | BODY MASS INDEX: 23.9 KG/M2 | HEIGHT: 64 IN | WEIGHT: 140 LBS | SYSTOLIC BLOOD PRESSURE: 129 MMHG

## 2024-01-01 DIAGNOSIS — I50.9 CHRONIC CONGESTIVE HEART FAILURE, UNSPECIFIED HEART FAILURE TYPE (HCC): Primary | ICD-10-CM

## 2024-01-01 DIAGNOSIS — J18.9 PNEUMONIA DUE TO INFECTIOUS ORGANISM, UNSPECIFIED LATERALITY, UNSPECIFIED PART OF LUNG: ICD-10-CM

## 2024-01-01 DIAGNOSIS — R18.8 OTHER ASCITES: ICD-10-CM

## 2024-01-01 DIAGNOSIS — K57.90 DIVERTICULOSIS: ICD-10-CM

## 2024-01-01 DIAGNOSIS — K80.20 CALCULUS OF GALLBLADDER WITHOUT CHOLECYSTITIS WITHOUT OBSTRUCTION: ICD-10-CM

## 2024-01-01 DIAGNOSIS — J90 PLEURAL EFFUSION: ICD-10-CM

## 2024-01-01 DIAGNOSIS — J96.00 ACUTE RESPIRATORY FAILURE, UNSPECIFIED WHETHER WITH HYPOXIA OR HYPERCAPNIA (HCC): ICD-10-CM

## 2024-01-01 LAB
ALBUMIN SERPL-MCNC: 3.6 G/DL (ref 3.5–4.6)
ALBUMIN SERPL-MCNC: 3.7 G/DL (ref 3.5–4.6)
ALP SERPL-CCNC: 105 U/L (ref 40–130)
ALP SERPL-CCNC: 108 U/L (ref 40–130)
ALT SERPL-CCNC: 60 U/L (ref 0–33)
ALT SERPL-CCNC: 69 U/L (ref 0–33)
ANION GAP SERPL CALCULATED.3IONS-SCNC: 18 MEQ/L (ref 9–15)
ANION GAP SERPL CALCULATED.3IONS-SCNC: 18 MEQ/L (ref 9–15)
ANTI-XA UNFRAC HEPARIN: <0.1 IU/ML
ANTI-XA UNFRAC HEPARIN: <0.1 IU/ML
APTT PPP: 30.3 SEC (ref 24.4–36.8)
APTT PPP: 36.9 SEC (ref 24.4–36.8)
AST SERPL-CCNC: 117 U/L (ref 0–35)
AST SERPL-CCNC: 68 U/L (ref 0–35)
B PARAP IS1001 DNA NPH QL NAA+NON-PROBE: NOT DETECTED
B PERT.PT PRMT NPH QL NAA+NON-PROBE: NOT DETECTED
BASOPHILS # BLD: 0 K/UL (ref 0–0.2)
BASOPHILS NFR BLD: 0 %
BILIRUB SERPL-MCNC: 0.9 MG/DL (ref 0.2–0.7)
BILIRUB SERPL-MCNC: 1.1 MG/DL (ref 0.2–0.7)
BNP BLD-MCNC: NORMAL PG/ML
BUN SERPL-MCNC: 35 MG/DL (ref 8–23)
BUN SERPL-MCNC: 35 MG/DL (ref 8–23)
C PNEUM DNA NPH QL NAA+NON-PROBE: NOT DETECTED
CALCIUM SERPL-MCNC: 9.5 MG/DL (ref 8.5–9.9)
CALCIUM SERPL-MCNC: 9.6 MG/DL (ref 8.5–9.9)
CHLORIDE SERPL-SCNC: 91 MEQ/L (ref 95–107)
CHLORIDE SERPL-SCNC: 91 MEQ/L (ref 95–107)
CO2 SERPL-SCNC: 22 MEQ/L (ref 20–31)
CO2 SERPL-SCNC: 24 MEQ/L (ref 20–31)
CREAT SERPL-MCNC: 3.93 MG/DL (ref 0.5–0.9)
CREAT SERPL-MCNC: 4.13 MG/DL (ref 0.5–0.9)
EKG ATRIAL RATE: 101 BPM
EKG P AXIS: 91 DEGREES
EKG P-R INTERVAL: 186 MS
EKG Q-T INTERVAL: 376 MS
EKG QRS DURATION: 116 MS
EKG QTC CALCULATION (BAZETT): 487 MS
EKG R AXIS: 186 DEGREES
EKG T AXIS: 78 DEGREES
EKG VENTRICULAR RATE: 101 BPM
EOSINOPHIL # BLD: 0 K/UL (ref 0–0.7)
EOSINOPHIL NFR BLD: 0.5 %
ERYTHROCYTE [DISTWIDTH] IN BLOOD BY AUTOMATED COUNT: 18 % (ref 11.5–14.5)
ERYTHROCYTE [DISTWIDTH] IN BLOOD BY AUTOMATED COUNT: 18.1 % (ref 11.5–14.5)
FLUAV RNA NPH QL NAA+NON-PROBE: NOT DETECTED
FLUBV RNA NPH QL NAA+NON-PROBE: NOT DETECTED
GLOBULIN SER CALC-MCNC: 2.9 G/DL (ref 2.3–3.5)
GLOBULIN SER CALC-MCNC: 3.1 G/DL (ref 2.3–3.5)
GLUCOSE BLD-MCNC: 172 MG/DL (ref 70–99)
GLUCOSE BLD-MCNC: 175 MG/DL (ref 70–99)
GLUCOSE BLD-MCNC: 209 MG/DL (ref 70–99)
GLUCOSE BLD-MCNC: 98 MG/DL (ref 70–99)
GLUCOSE SERPL-MCNC: 105 MG/DL (ref 70–99)
GLUCOSE SERPL-MCNC: 111 MG/DL (ref 70–99)
HADV DNA NPH QL NAA+NON-PROBE: NOT DETECTED
HCOV 229E RNA NPH QL NAA+NON-PROBE: NOT DETECTED
HCOV HKU1 RNA NPH QL NAA+NON-PROBE: NOT DETECTED
HCOV NL63 RNA NPH QL NAA+NON-PROBE: NOT DETECTED
HCOV OC43 RNA NPH QL NAA+NON-PROBE: NOT DETECTED
HCT VFR BLD AUTO: 38.3 % (ref 37–47)
HCT VFR BLD AUTO: 38.5 % (ref 37–47)
HGB BLD-MCNC: 12 G/DL (ref 12–16)
HGB BLD-MCNC: 12.1 G/DL (ref 12–16)
HMPV RNA NPH QL NAA+NON-PROBE: NOT DETECTED
HPIV1 RNA NPH QL NAA+NON-PROBE: NOT DETECTED
HPIV2 RNA NPH QL NAA+NON-PROBE: NOT DETECTED
HPIV3 RNA NPH QL NAA+NON-PROBE: NOT DETECTED
HPIV4 RNA NPH QL NAA+NON-PROBE: NOT DETECTED
INR PPP: 2.2
INR PPP: 2.5
LACTIC ACID, SEPSIS: 1.2 MMOL/L (ref 0.5–1.9)
LACTIC ACID, SEPSIS: 1.9 MMOL/L (ref 0.5–1.9)
LIPASE SERPL-CCNC: 43 U/L (ref 12–95)
LYMPHOCYTES # BLD: 1.3 K/UL (ref 1–4.8)
LYMPHOCYTES NFR BLD: 17.5 %
M PNEUMO DNA NPH QL NAA+NON-PROBE: NOT DETECTED
MAGNESIUM SERPL-MCNC: 2.5 MG/DL (ref 1.7–2.4)
MCH RBC QN AUTO: 28.7 PG (ref 27–31.3)
MCH RBC QN AUTO: 28.9 PG (ref 27–31.3)
MCHC RBC AUTO-ENTMCNC: 31.3 % (ref 33–37)
MCHC RBC AUTO-ENTMCNC: 31.4 % (ref 33–37)
MCV RBC AUTO: 91.6 FL (ref 79.4–94.8)
MCV RBC AUTO: 91.9 FL (ref 79.4–94.8)
MONOCYTES # BLD: 0.8 K/UL (ref 0.2–0.8)
MONOCYTES NFR BLD: 10.2 %
NEUTROPHILS # BLD: 5.5 K/UL (ref 1.4–6.5)
NEUTS SEG NFR BLD: 71.5 %
PERFORMED ON: ABNORMAL
PERFORMED ON: NORMAL
PLATELET # BLD AUTO: 156 K/UL (ref 130–400)
PLATELET # BLD AUTO: 169 K/UL (ref 130–400)
POTASSIUM SERPL-SCNC: 3.9 MEQ/L (ref 3.4–4.9)
POTASSIUM SERPL-SCNC: 4.6 MEQ/L (ref 3.4–4.9)
PROCALCITONIN SERPL IA-MCNC: 0.25 NG/ML (ref 0–0.15)
PROT SERPL-MCNC: 6.5 G/DL (ref 6.3–8)
PROT SERPL-MCNC: 6.8 G/DL (ref 6.3–8)
PROTHROMBIN TIME: 24.2 SEC (ref 12.3–14.9)
PROTHROMBIN TIME: 26.6 SEC (ref 12.3–14.9)
RBC # BLD AUTO: 4.18 M/UL (ref 4.2–5.4)
RBC # BLD AUTO: 4.19 M/UL (ref 4.2–5.4)
REASON FOR REJECTION: NORMAL
REJECTED TEST: NORMAL
RSV RNA NPH QL NAA+NON-PROBE: NOT DETECTED
RV+EV RNA NPH QL NAA+NON-PROBE: NOT DETECTED
SARS-COV-2 RNA NPH QL NAA+NON-PROBE: NOT DETECTED
SODIUM SERPL-SCNC: 131 MEQ/L (ref 135–144)
SODIUM SERPL-SCNC: 133 MEQ/L (ref 135–144)
TROPONIN, HIGH SENSITIVITY: 2778 NG/L (ref 0–19)
TROPONIN, HIGH SENSITIVITY: 2831 NG/L (ref 0–19)
VANCOMYCIN SERPL-MCNC: <4 UG/ML (ref 10–40)
WBC # BLD AUTO: 7.4 K/UL (ref 4.8–10.8)
WBC # BLD AUTO: 7.6 K/UL (ref 4.8–10.8)

## 2024-01-01 PROCEDURE — 2500000003 HC RX 250 WO HCPCS: Performed by: EMERGENCY MEDICINE

## 2024-01-01 PROCEDURE — 2060000000 HC ICU INTERMEDIATE R&B

## 2024-01-01 PROCEDURE — 83735 ASSAY OF MAGNESIUM: CPT

## 2024-01-01 PROCEDURE — 2500000003 HC RX 250 WO HCPCS: Performed by: INTERNAL MEDICINE

## 2024-01-01 PROCEDURE — 96374 THER/PROPH/DIAG INJ IV PUSH: CPT

## 2024-01-01 PROCEDURE — 74176 CT ABD & PELVIS W/O CONTRAST: CPT

## 2024-01-01 PROCEDURE — 5A1D70Z PERFORMANCE OF URINARY FILTRATION, INTERMITTENT, LESS THAN 6 HOURS PER DAY: ICD-10-PCS | Performed by: INTERNAL MEDICINE

## 2024-01-01 PROCEDURE — 85730 THROMBOPLASTIN TIME PARTIAL: CPT

## 2024-01-01 PROCEDURE — 83690 ASSAY OF LIPASE: CPT

## 2024-01-01 PROCEDURE — 83605 ASSAY OF LACTIC ACID: CPT

## 2024-01-01 PROCEDURE — 0202U NFCT DS 22 TRGT SARS-COV-2: CPT

## 2024-01-01 PROCEDURE — 87040 BLOOD CULTURE FOR BACTERIA: CPT

## 2024-01-01 PROCEDURE — 85520 HEPARIN ASSAY: CPT

## 2024-01-01 PROCEDURE — 83880 ASSAY OF NATRIURETIC PEPTIDE: CPT

## 2024-01-01 PROCEDURE — 84484 ASSAY OF TROPONIN QUANT: CPT

## 2024-01-01 PROCEDURE — 93010 ELECTROCARDIOGRAM REPORT: CPT | Performed by: INTERNAL MEDICINE

## 2024-01-01 PROCEDURE — 3E033XZ INTRODUCTION OF VASOPRESSOR INTO PERIPHERAL VEIN, PERCUTANEOUS APPROACH: ICD-10-PCS | Performed by: INTERNAL MEDICINE

## 2024-01-01 PROCEDURE — 6360000002 HC RX W HCPCS: Performed by: INTERNAL MEDICINE

## 2024-01-01 PROCEDURE — 96365 THER/PROPH/DIAG IV INF INIT: CPT

## 2024-01-01 PROCEDURE — 6370000000 HC RX 637 (ALT 250 FOR IP): Performed by: INTERNAL MEDICINE

## 2024-01-01 PROCEDURE — 87641 MR-STAPH DNA AMP PROBE: CPT

## 2024-01-01 PROCEDURE — 99223 1ST HOSP IP/OBS HIGH 75: CPT | Performed by: INTERNAL MEDICINE

## 2024-01-01 PROCEDURE — 6360000002 HC RX W HCPCS: Performed by: EMERGENCY MEDICINE

## 2024-01-01 PROCEDURE — 85025 COMPLETE CBC W/AUTO DIFF WBC: CPT

## 2024-01-01 PROCEDURE — 85027 COMPLETE CBC AUTOMATED: CPT

## 2024-01-01 PROCEDURE — 99285 EMERGENCY DEPT VISIT HI MDM: CPT

## 2024-01-01 PROCEDURE — 5A1935Z RESPIRATORY VENTILATION, LESS THAN 24 CONSECUTIVE HOURS: ICD-10-PCS | Performed by: INTERNAL MEDICINE

## 2024-01-01 PROCEDURE — 2580000003 HC RX 258: Performed by: INTERNAL MEDICINE

## 2024-01-01 PROCEDURE — 93005 ELECTROCARDIOGRAM TRACING: CPT | Performed by: EMERGENCY MEDICINE

## 2024-01-01 PROCEDURE — 94002 VENT MGMT INPAT INIT DAY: CPT

## 2024-01-01 PROCEDURE — 84145 PROCALCITONIN (PCT): CPT

## 2024-01-01 PROCEDURE — 71045 X-RAY EXAM CHEST 1 VIEW: CPT

## 2024-01-01 PROCEDURE — 36415 COLL VENOUS BLD VENIPUNCTURE: CPT

## 2024-01-01 PROCEDURE — 31500 INSERT EMERGENCY AIRWAY: CPT

## 2024-01-01 PROCEDURE — 85610 PROTHROMBIN TIME: CPT

## 2024-01-01 PROCEDURE — 2580000003 HC RX 258: Performed by: EMERGENCY MEDICINE

## 2024-01-01 PROCEDURE — 80202 ASSAY OF VANCOMYCIN: CPT

## 2024-01-01 PROCEDURE — 80053 COMPREHEN METABOLIC PANEL: CPT

## 2024-01-01 PROCEDURE — 96375 TX/PRO/DX INJ NEW DRUG ADDON: CPT

## 2024-01-01 PROCEDURE — 0BH17EZ INSERTION OF ENDOTRACHEAL AIRWAY INTO TRACHEA, VIA NATURAL OR ARTIFICIAL OPENING: ICD-10-PCS | Performed by: INTERNAL MEDICINE

## 2024-01-01 RX ORDER — HEPARIN SODIUM 1000 [USP'U]/ML
60 INJECTION, SOLUTION INTRAVENOUS; SUBCUTANEOUS PRN
Status: DISCONTINUED | OUTPATIENT
Start: 2024-01-01 | End: 2024-01-01 | Stop reason: HOSPADM

## 2024-01-01 RX ORDER — POLYETHYLENE GLYCOL 3350 17 G/17G
17 POWDER, FOR SOLUTION ORAL DAILY PRN
COMMUNITY

## 2024-01-01 RX ORDER — MORPHINE SULFATE 4 MG/ML
4 INJECTION, SOLUTION INTRAMUSCULAR; INTRAVENOUS ONCE
Status: COMPLETED | OUTPATIENT
Start: 2024-01-01 | End: 2024-01-01

## 2024-01-01 RX ORDER — ACETAMINOPHEN 650 MG/1
650 SUPPOSITORY RECTAL EVERY 6 HOURS PRN
Status: DISCONTINUED | OUTPATIENT
Start: 2024-01-01 | End: 2024-01-01 | Stop reason: HOSPADM

## 2024-01-01 RX ORDER — SODIUM CHLORIDE 9 MG/ML
INJECTION, SOLUTION INTRAVENOUS PRN
Status: DISCONTINUED | OUTPATIENT
Start: 2024-01-01 | End: 2024-01-01 | Stop reason: HOSPADM

## 2024-01-01 RX ORDER — ATORVASTATIN CALCIUM 80 MG/1
80 TABLET, FILM COATED ORAL NIGHTLY
COMMUNITY
Start: 2024-01-01

## 2024-01-01 RX ORDER — METOPROLOL SUCCINATE 25 MG/1
25 TABLET, EXTENDED RELEASE ORAL DAILY
Status: DISCONTINUED | OUTPATIENT
Start: 2024-01-01 | End: 2024-01-01 | Stop reason: HOSPADM

## 2024-01-01 RX ORDER — FENTANYL CITRATE 50 UG/ML
INJECTION, SOLUTION INTRAMUSCULAR; INTRAVENOUS
Status: DISCONTINUED
Start: 2024-01-01 | End: 2024-01-01 | Stop reason: HOSPADM

## 2024-01-01 RX ORDER — MIDODRINE HYDROCHLORIDE 10 MG/1
10 TABLET ORAL DAILY
Status: DISCONTINUED | OUTPATIENT
Start: 2024-01-01 | End: 2024-01-01 | Stop reason: HOSPADM

## 2024-01-01 RX ORDER — HEPARIN SODIUM 5000 [USP'U]/ML
5000 INJECTION, SOLUTION INTRAVENOUS; SUBCUTANEOUS EVERY 8 HOURS SCHEDULED
Status: DISCONTINUED | OUTPATIENT
Start: 2024-01-01 | End: 2024-01-01

## 2024-01-01 RX ORDER — FENTANYL CITRATE 0.05 MG/ML
INJECTION, SOLUTION INTRAMUSCULAR; INTRAVENOUS
Status: COMPLETED | OUTPATIENT
Start: 2024-01-01 | End: 2024-01-01

## 2024-01-01 RX ORDER — POTASSIUM CHLORIDE 7.45 MG/ML
10 INJECTION INTRAVENOUS PRN
Status: DISCONTINUED | OUTPATIENT
Start: 2024-01-01 | End: 2024-01-01

## 2024-01-01 RX ORDER — POTASSIUM CHLORIDE 20 MEQ/1
40 TABLET, EXTENDED RELEASE ORAL PRN
Status: DISCONTINUED | OUTPATIENT
Start: 2024-01-01 | End: 2024-01-01

## 2024-01-01 RX ORDER — PROPOFOL 10 MG/ML
5-50 INJECTION, EMULSION INTRAVENOUS CONTINUOUS
Status: DISCONTINUED | OUTPATIENT
Start: 2024-01-01 | End: 2024-01-01 | Stop reason: HOSPADM

## 2024-01-01 RX ORDER — ACETAMINOPHEN 325 MG/1
650 TABLET ORAL EVERY 6 HOURS PRN
Status: DISCONTINUED | OUTPATIENT
Start: 2024-01-01 | End: 2024-01-01 | Stop reason: HOSPADM

## 2024-01-01 RX ORDER — SENNOSIDES A AND B 8.6 MG/1
8.6 TABLET, FILM COATED ORAL 2 TIMES DAILY
COMMUNITY
Start: 2024-01-01

## 2024-01-01 RX ORDER — SODIUM CHLORIDE 0.9 % (FLUSH) 0.9 %
5-40 SYRINGE (ML) INJECTION EVERY 12 HOURS SCHEDULED
Status: DISCONTINUED | OUTPATIENT
Start: 2024-01-01 | End: 2024-01-01 | Stop reason: HOSPADM

## 2024-01-01 RX ORDER — SENNOSIDES A AND B 8.6 MG/1
1 TABLET, FILM COATED ORAL 2 TIMES DAILY
Status: DISCONTINUED | OUTPATIENT
Start: 2024-01-01 | End: 2024-01-01 | Stop reason: HOSPADM

## 2024-01-01 RX ORDER — HEPARIN SODIUM 1000 [USP'U]/ML
1000 INJECTION, SOLUTION INTRAVENOUS; SUBCUTANEOUS PRN
Status: DISCONTINUED | OUTPATIENT
Start: 2024-01-01 | End: 2024-01-01 | Stop reason: HOSPADM

## 2024-01-01 RX ORDER — ONDANSETRON 2 MG/ML
4 INJECTION INTRAMUSCULAR; INTRAVENOUS EVERY 6 HOURS PRN
Status: DISCONTINUED | OUTPATIENT
Start: 2024-01-01 | End: 2024-01-01 | Stop reason: HOSPADM

## 2024-01-01 RX ORDER — ONDANSETRON 2 MG/ML
4 INJECTION INTRAMUSCULAR; INTRAVENOUS ONCE
Status: COMPLETED | OUTPATIENT
Start: 2024-01-01 | End: 2024-01-01

## 2024-01-01 RX ORDER — LANOLIN ALCOHOL/MO/W.PET/CERES
3 CREAM (GRAM) TOPICAL NIGHTLY PRN
Status: DISCONTINUED | OUTPATIENT
Start: 2024-01-01 | End: 2024-01-01 | Stop reason: HOSPADM

## 2024-01-01 RX ORDER — INSULIN LISPRO 100 [IU]/ML
1-12 INJECTION, SOLUTION INTRAVENOUS; SUBCUTANEOUS
COMMUNITY
Start: 2024-01-01

## 2024-01-01 RX ORDER — INSULIN LISPRO 100 [IU]/ML
0-4 INJECTION, SOLUTION INTRAVENOUS; SUBCUTANEOUS NIGHTLY
Status: DISCONTINUED | OUTPATIENT
Start: 2024-01-01 | End: 2024-01-01 | Stop reason: HOSPADM

## 2024-01-01 RX ORDER — INSULIN LISPRO 100 [IU]/ML
0-8 INJECTION, SOLUTION INTRAVENOUS; SUBCUTANEOUS
Status: DISCONTINUED | OUTPATIENT
Start: 2024-01-01 | End: 2024-01-01 | Stop reason: HOSPADM

## 2024-01-01 RX ORDER — 0.9 % SODIUM CHLORIDE 0.9 %
500 INTRAVENOUS SOLUTION INTRAVENOUS ONCE
Status: DISCONTINUED | OUTPATIENT
Start: 2024-01-01 | End: 2024-01-01

## 2024-01-01 RX ORDER — MIDODRINE HYDROCHLORIDE 10 MG/1
10 TABLET ORAL
COMMUNITY
Start: 2024-01-01

## 2024-01-01 RX ORDER — SODIUM CHLORIDE 0.9 % (FLUSH) 0.9 %
5-40 SYRINGE (ML) INJECTION PRN
Status: DISCONTINUED | OUTPATIENT
Start: 2024-01-01 | End: 2024-01-01 | Stop reason: HOSPADM

## 2024-01-01 RX ORDER — FENTANYL CITRATE-0.9 % NACL/PF 10 MCG/ML
25-200 PLASTIC BAG, INJECTION (ML) INTRAVENOUS CONTINUOUS
Status: DISCONTINUED | OUTPATIENT
Start: 2024-01-01 | End: 2024-01-01 | Stop reason: HOSPADM

## 2024-01-01 RX ORDER — GLUCAGON 1 MG/ML
1 KIT INJECTION PRN
Status: DISCONTINUED | OUTPATIENT
Start: 2024-01-01 | End: 2024-01-01 | Stop reason: HOSPADM

## 2024-01-01 RX ORDER — HEPARIN SODIUM 10000 [USP'U]/100ML
5-30 INJECTION, SOLUTION INTRAVENOUS CONTINUOUS
Status: DISCONTINUED | OUTPATIENT
Start: 2024-01-01 | End: 2024-01-01 | Stop reason: HOSPADM

## 2024-01-01 RX ORDER — LANOLIN ALCOHOL/MO/W.PET/CERES
3 CREAM (GRAM) TOPICAL NIGHTLY PRN
COMMUNITY

## 2024-01-01 RX ORDER — SIMETHICONE 80 MG
80 TABLET,CHEWABLE ORAL 4 TIMES DAILY PRN
COMMUNITY
Start: 2024-01-01

## 2024-01-01 RX ORDER — DEXTROSE MONOHYDRATE 100 MG/ML
INJECTION, SOLUTION INTRAVENOUS CONTINUOUS PRN
Status: DISCONTINUED | OUTPATIENT
Start: 2024-01-01 | End: 2024-01-01 | Stop reason: HOSPADM

## 2024-01-01 RX ORDER — POLYETHYLENE GLYCOL 3350 17 G/17G
17 POWDER, FOR SOLUTION ORAL DAILY PRN
Status: DISCONTINUED | OUTPATIENT
Start: 2024-01-01 | End: 2024-01-01 | Stop reason: HOSPADM

## 2024-01-01 RX ORDER — WARFARIN SODIUM 2 MG/1
2 TABLET ORAL NIGHTLY
COMMUNITY

## 2024-01-01 RX ORDER — MAGNESIUM SULFATE IN WATER 40 MG/ML
2000 INJECTION, SOLUTION INTRAVENOUS PRN
Status: DISCONTINUED | OUTPATIENT
Start: 2024-01-01 | End: 2024-01-01

## 2024-01-01 RX ORDER — HEPARIN SODIUM 1000 [USP'U]/ML
30 INJECTION, SOLUTION INTRAVENOUS; SUBCUTANEOUS PRN
Status: DISCONTINUED | OUTPATIENT
Start: 2024-01-01 | End: 2024-01-01 | Stop reason: HOSPADM

## 2024-01-01 RX ORDER — HEPARIN SODIUM 1000 [USP'U]/ML
60 INJECTION, SOLUTION INTRAVENOUS; SUBCUTANEOUS ONCE
Status: DISCONTINUED | OUTPATIENT
Start: 2024-01-01 | End: 2024-01-01 | Stop reason: HOSPADM

## 2024-01-01 RX ORDER — ASCORBIC ACID, THIAMINE MONONITRATE,RIBOFLAVIN, NIACINAMIDE, PYRIDOXINE HYDROCHLORIDE, FOLIC ACID, CYANOCOBALAMIN, BIOTIN, CALCIUM PANTOTHENATE, 100; 1.5; 1.7; 20; 10; 1; 6000; 150000; 5 MG/1; MG/1; MG/1; MG/1; MG/1; MG/1; UG/1; UG/1; MG/1
1 CAPSULE, LIQUID FILLED ORAL DAILY
COMMUNITY

## 2024-01-01 RX ORDER — PHYTONADIONE 5 MG/1
5 TABLET ORAL ONCE
Status: COMPLETED | OUTPATIENT
Start: 2024-01-01 | End: 2024-01-01

## 2024-01-01 RX ORDER — ATORVASTATIN CALCIUM 80 MG/1
80 TABLET, FILM COATED ORAL NIGHTLY
Status: DISCONTINUED | OUTPATIENT
Start: 2024-01-01 | End: 2024-01-01 | Stop reason: HOSPADM

## 2024-01-01 RX ORDER — INSULIN GLARGINE 100 [IU]/ML
4 INJECTION, SOLUTION SUBCUTANEOUS EVERY MORNING
Status: DISCONTINUED | OUTPATIENT
Start: 2024-01-01 | End: 2024-01-01 | Stop reason: HOSPADM

## 2024-01-01 RX ORDER — ALBUMIN (HUMAN) 12.5 G/50ML
25 SOLUTION INTRAVENOUS DAILY PRN
Status: DISCONTINUED | OUTPATIENT
Start: 2024-01-01 | End: 2024-01-01 | Stop reason: HOSPADM

## 2024-01-01 RX ORDER — ETOMIDATE 2 MG/ML
INJECTION INTRAVENOUS
Status: COMPLETED | OUTPATIENT
Start: 2024-01-01 | End: 2024-01-01

## 2024-01-01 RX ORDER — ONDANSETRON 4 MG/1
4 TABLET, ORALLY DISINTEGRATING ORAL EVERY 8 HOURS PRN
Status: DISCONTINUED | OUTPATIENT
Start: 2024-01-01 | End: 2024-01-01 | Stop reason: HOSPADM

## 2024-01-01 RX ORDER — ACETAMINOPHEN 325 MG/1
325-650 TABLET ORAL EVERY 6 HOURS PRN
COMMUNITY
Start: 2024-01-01

## 2024-01-01 RX ADMIN — HEPARIN SODIUM 3810 UNITS: 1000 INJECTION INTRAVENOUS; SUBCUTANEOUS at 21:16

## 2024-01-01 RX ADMIN — ATORVASTATIN CALCIUM 80 MG: 80 TABLET, FILM COATED ORAL at 21:17

## 2024-01-01 RX ADMIN — MORPHINE SULFATE 4 MG: 4 INJECTION, SOLUTION INTRAMUSCULAR; INTRAVENOUS at 05:44

## 2024-01-01 RX ADMIN — SODIUM CHLORIDE, PRESERVATIVE FREE 10 ML: 5 INJECTION INTRAVENOUS at 12:56

## 2024-01-01 RX ADMIN — SODIUM CHLORIDE, PRESERVATIVE FREE 10 ML: 5 INJECTION INTRAVENOUS at 21:17

## 2024-01-01 RX ADMIN — DOXYCYCLINE 100 MG: 100 INJECTION, POWDER, LYOPHILIZED, FOR SOLUTION INTRAVENOUS at 07:17

## 2024-01-01 RX ADMIN — PIPERACILLIN AND TAZOBACTAM 3375 MG: 3; .375 INJECTION, POWDER, LYOPHILIZED, FOR SOLUTION INTRAVENOUS at 15:34

## 2024-01-01 RX ADMIN — MIDODRINE HYDROCHLORIDE 10 MG: 10 TABLET ORAL at 15:22

## 2024-01-01 RX ADMIN — ONDANSETRON 4 MG: 2 INJECTION INTRAMUSCULAR; INTRAVENOUS at 05:44

## 2024-01-01 RX ADMIN — ETOMIDATE 20 MG: 2 INJECTION, SOLUTION INTRAVENOUS at 01:11

## 2024-01-01 RX ADMIN — FENTANYL CITRATE 100 MCG: 50 INJECTION INTRAMUSCULAR; INTRAVENOUS at 01:12

## 2024-01-01 RX ADMIN — HEPARIN SODIUM 3810 UNITS: 1000 INJECTION INTRAVENOUS; SUBCUTANEOUS at 15:23

## 2024-01-01 RX ADMIN — METOPROLOL SUCCINATE 25 MG: 25 TABLET, FILM COATED, EXTENDED RELEASE ORAL at 16:28

## 2024-01-01 RX ADMIN — VANCOMYCIN HYDROCHLORIDE 1250 MG: 1.25 INJECTION, POWDER, LYOPHILIZED, FOR SOLUTION INTRAVENOUS at 13:01

## 2024-01-01 RX ADMIN — INSULIN GLARGINE 4 UNITS: 100 INJECTION, SOLUTION SUBCUTANEOUS at 12:59

## 2024-01-01 RX ADMIN — PHYTONADIONE 5 MG: 5 TABLET ORAL at 12:55

## 2024-01-01 RX ADMIN — HEPARIN SODIUM 12 UNITS/KG/HR: 10000 INJECTION, SOLUTION INTRAVENOUS at 15:28

## 2024-01-01 RX ADMIN — CEFTRIAXONE SODIUM 1000 MG: 1 INJECTION, POWDER, FOR SOLUTION INTRAMUSCULAR; INTRAVENOUS at 07:16

## 2024-01-01 RX ADMIN — SENNOSIDES 8.6 MG: 8.6 TABLET, FILM COATED ORAL at 21:17

## 2024-01-01 ASSESSMENT — PAIN SCALES - GENERAL
PAINLEVEL_OUTOF10: 6
PAINLEVEL_OUTOF10: 10

## 2024-01-01 ASSESSMENT — PULMONARY FUNCTION TESTS: PIF_VALUE: 36

## 2024-01-01 ASSESSMENT — PAIN DESCRIPTION - FREQUENCY: FREQUENCY: CONTINUOUS

## 2024-01-01 ASSESSMENT — PAIN - FUNCTIONAL ASSESSMENT
PAIN_FUNCTIONAL_ASSESSMENT: NONE - DENIES PAIN
PAIN_FUNCTIONAL_ASSESSMENT: 0-10

## 2024-01-01 ASSESSMENT — PAIN DESCRIPTION - LOCATION
LOCATION: ABDOMEN
LOCATION: ABDOMEN

## 2024-01-01 ASSESSMENT — PAIN DESCRIPTION - PAIN TYPE: TYPE: CHRONIC PAIN

## 2024-01-01 ASSESSMENT — PAIN DESCRIPTION - DESCRIPTORS: DESCRIPTORS: DULL

## 2024-01-01 ASSESSMENT — PAIN DESCRIPTION - ORIENTATION: ORIENTATION: UPPER

## 2024-01-28 ENCOUNTER — APPOINTMENT (OUTPATIENT)
Dept: GENERAL RADIOLOGY | Age: 74
DRG: 215 | End: 2024-01-28
Payer: MEDICARE

## 2024-01-28 ENCOUNTER — APPOINTMENT (OUTPATIENT)
Dept: CT IMAGING | Age: 74
DRG: 215 | End: 2024-01-28
Payer: MEDICARE

## 2024-01-28 ENCOUNTER — HOSPITAL ENCOUNTER (INPATIENT)
Age: 74
LOS: 2 days | Discharge: HOME | DRG: 215 | End: 2024-01-30
Attending: STUDENT IN AN ORGANIZED HEALTH CARE EDUCATION/TRAINING PROGRAM | Admitting: INTERNAL MEDICINE
Payer: MEDICARE

## 2024-01-28 DIAGNOSIS — I21.4 NSTEMI (NON-ST ELEVATED MYOCARDIAL INFARCTION) (HCC): Primary | ICD-10-CM

## 2024-01-28 DIAGNOSIS — K81.9 CHOLECYSTITIS: ICD-10-CM

## 2024-01-28 DIAGNOSIS — I25.2 HISTORY OF NON-ST ELEVATION MYOCARDIAL INFARCTION (NSTEMI): ICD-10-CM

## 2024-01-28 DIAGNOSIS — N17.9 AKI (ACUTE KIDNEY INJURY) (HCC): ICD-10-CM

## 2024-01-28 DIAGNOSIS — R79.89 ELEVATED TROPONIN: ICD-10-CM

## 2024-01-28 LAB
ALBUMIN SERPL-MCNC: 3.6 G/DL (ref 3.5–4.6)
ALP SERPL-CCNC: 53 U/L (ref 40–130)
ALT SERPL-CCNC: 41 U/L (ref 0–33)
ANION GAP SERPL CALCULATED.3IONS-SCNC: 12 MEQ/L (ref 9–15)
AST SERPL-CCNC: 52 U/L (ref 0–35)
BASOPHILS # BLD: 0 K/UL (ref 0–0.2)
BASOPHILS NFR BLD: 0.2 %
BILIRUB SERPL-MCNC: 0.4 MG/DL (ref 0.2–0.7)
BILIRUB UR QL STRIP: NEGATIVE
BNP BLD-MCNC: NORMAL PG/ML
BUN SERPL-MCNC: 33 MG/DL (ref 8–23)
CALCIUM SERPL-MCNC: 8.6 MG/DL (ref 8.5–9.9)
CHLORIDE SERPL-SCNC: 90 MEQ/L (ref 95–107)
CLARITY UR: CLEAR
CO2 SERPL-SCNC: 22 MEQ/L (ref 20–31)
COLOR UR: YELLOW
CREAT SERPL-MCNC: 2.04 MG/DL (ref 0.5–0.9)
D DIMER PPP FEU-MCNC: 1.76 MG/L FEU (ref 0–0.5)
EOSINOPHIL # BLD: 0.4 K/UL (ref 0–0.7)
EOSINOPHIL NFR BLD: 6.1 %
ERYTHROCYTE [DISTWIDTH] IN BLOOD BY AUTOMATED COUNT: 12.8 % (ref 11.5–14.5)
GLOBULIN SER CALC-MCNC: 2.9 G/DL (ref 2.3–3.5)
GLUCOSE BLD-MCNC: 210 MG/DL (ref 70–99)
GLUCOSE SERPL-MCNC: 204 MG/DL (ref 70–99)
GLUCOSE UR STRIP-MCNC: NEGATIVE MG/DL
HCT VFR BLD AUTO: 30.1 % (ref 37–47)
HGB BLD-MCNC: 10 G/DL (ref 12–16)
HGB UR QL STRIP: NEGATIVE
INFLUENZA A BY PCR: NEGATIVE
INFLUENZA B BY PCR: NEGATIVE
KETONES UR STRIP-MCNC: NEGATIVE MG/DL
LEUKOCYTE ESTERASE UR QL STRIP: NEGATIVE
LYMPHOCYTES # BLD: 1.3 K/UL (ref 1–4.8)
LYMPHOCYTES NFR BLD: 22.9 %
MCH RBC QN AUTO: 29.2 PG (ref 27–31.3)
MCHC RBC AUTO-ENTMCNC: 33.2 % (ref 33–37)
MCV RBC AUTO: 87.8 FL (ref 79.4–94.8)
MONOCYTES # BLD: 0.6 K/UL (ref 0.2–0.8)
MONOCYTES NFR BLD: 9.5 %
NEUTROPHILS # BLD: 3.5 K/UL (ref 1.4–6.5)
NEUTS SEG NFR BLD: 61 %
NITRITE UR QL STRIP: NEGATIVE
PERFORMED ON: ABNORMAL
PH UR STRIP: 5 [PH] (ref 5–9)
PLATELET # BLD AUTO: 153 K/UL (ref 130–400)
POTASSIUM SERPL-SCNC: 4.9 MEQ/L (ref 3.4–4.9)
PROT SERPL-MCNC: 6.5 G/DL (ref 6.3–8)
PROT UR STRIP-MCNC: NEGATIVE MG/DL
RBC # BLD AUTO: 3.43 M/UL (ref 4.2–5.4)
REASON FOR REJECTION: NORMAL
REJECTED TEST: NORMAL
SARS-COV-2 RDRP RESP QL NAA+PROBE: NOT DETECTED
SODIUM SERPL-SCNC: 124 MEQ/L (ref 135–144)
SP GR UR STRIP: 1.01 (ref 1–1.03)
TROPONIN, HIGH SENSITIVITY: 2056 NG/L (ref 0–19)
TROPONIN, HIGH SENSITIVITY: 2256 NG/L (ref 0–19)
URINE REFLEX TO CULTURE: NORMAL
UROBILINOGEN UR STRIP-ACNC: 0.2 E.U./DL
WBC # BLD AUTO: 5.8 K/UL (ref 4.8–10.8)

## 2024-01-28 PROCEDURE — 84484 ASSAY OF TROPONIN QUANT: CPT

## 2024-01-28 PROCEDURE — 81003 URINALYSIS AUTO W/O SCOPE: CPT

## 2024-01-28 PROCEDURE — 96374 THER/PROPH/DIAG INJ IV PUSH: CPT

## 2024-01-28 PROCEDURE — 99223 1ST HOSP IP/OBS HIGH 75: CPT | Performed by: INTERNAL MEDICINE

## 2024-01-28 PROCEDURE — 99285 EMERGENCY DEPT VISIT HI MDM: CPT

## 2024-01-28 PROCEDURE — 87502 INFLUENZA DNA AMP PROBE: CPT

## 2024-01-28 PROCEDURE — 6360000002 HC RX W HCPCS: Performed by: INTERNAL MEDICINE

## 2024-01-28 PROCEDURE — 71250 CT THORAX DX C-: CPT

## 2024-01-28 PROCEDURE — 2580000003 HC RX 258: Performed by: INTERNAL MEDICINE

## 2024-01-28 PROCEDURE — 80053 COMPREHEN METABOLIC PANEL: CPT

## 2024-01-28 PROCEDURE — 87635 SARS-COV-2 COVID-19 AMP PRB: CPT

## 2024-01-28 PROCEDURE — 83880 ASSAY OF NATRIURETIC PEPTIDE: CPT

## 2024-01-28 PROCEDURE — 74176 CT ABD & PELVIS W/O CONTRAST: CPT

## 2024-01-28 PROCEDURE — 71046 X-RAY EXAM CHEST 2 VIEWS: CPT

## 2024-01-28 PROCEDURE — 74019 RADEX ABDOMEN 2 VIEWS: CPT

## 2024-01-28 PROCEDURE — 96372 THER/PROPH/DIAG INJ SC/IM: CPT

## 2024-01-28 PROCEDURE — 96375 TX/PRO/DX INJ NEW DRUG ADDON: CPT

## 2024-01-28 PROCEDURE — 6360000002 HC RX W HCPCS: Performed by: NURSE PRACTITIONER

## 2024-01-28 PROCEDURE — 83036 HEMOGLOBIN GLYCOSYLATED A1C: CPT

## 2024-01-28 PROCEDURE — 6370000000 HC RX 637 (ALT 250 FOR IP): Performed by: INTERNAL MEDICINE

## 2024-01-28 PROCEDURE — 85025 COMPLETE CBC W/AUTO DIFF WBC: CPT

## 2024-01-28 PROCEDURE — 93005 ELECTROCARDIOGRAM TRACING: CPT | Performed by: NURSE PRACTITIONER

## 2024-01-28 PROCEDURE — 36415 COLL VENOUS BLD VENIPUNCTURE: CPT

## 2024-01-28 PROCEDURE — 2060000000 HC ICU INTERMEDIATE R&B

## 2024-01-28 PROCEDURE — 85379 FIBRIN DEGRADATION QUANT: CPT

## 2024-01-28 RX ORDER — ATORVASTATIN CALCIUM 80 MG/1
80 TABLET, FILM COATED ORAL NIGHTLY
Status: DISCONTINUED | OUTPATIENT
Start: 2024-01-28 | End: 2024-01-30 | Stop reason: HOSPADM

## 2024-01-28 RX ORDER — POLYETHYLENE GLYCOL 3350 17 G/17G
17 POWDER, FOR SOLUTION ORAL DAILY PRN
Status: DISCONTINUED | OUTPATIENT
Start: 2024-01-28 | End: 2024-01-30 | Stop reason: HOSPADM

## 2024-01-28 RX ORDER — ONDANSETRON 4 MG/1
4 TABLET, ORALLY DISINTEGRATING ORAL EVERY 8 HOURS PRN
Status: DISCONTINUED | OUTPATIENT
Start: 2024-01-28 | End: 2024-01-30 | Stop reason: HOSPADM

## 2024-01-28 RX ORDER — DEXTROSE MONOHYDRATE 100 MG/ML
INJECTION, SOLUTION INTRAVENOUS CONTINUOUS PRN
Status: DISCONTINUED | OUTPATIENT
Start: 2024-01-28 | End: 2024-01-30 | Stop reason: HOSPADM

## 2024-01-28 RX ORDER — INSULIN LISPRO 100 [IU]/ML
0-8 INJECTION, SOLUTION INTRAVENOUS; SUBCUTANEOUS
Status: DISCONTINUED | OUTPATIENT
Start: 2024-01-28 | End: 2024-01-30 | Stop reason: HOSPADM

## 2024-01-28 RX ORDER — HEPARIN SODIUM 5000 [USP'U]/ML
5000 INJECTION, SOLUTION INTRAVENOUS; SUBCUTANEOUS EVERY 8 HOURS SCHEDULED
Status: DISCONTINUED | OUTPATIENT
Start: 2024-01-28 | End: 2024-01-30

## 2024-01-28 RX ORDER — NITROGLYCERIN 0.4 MG/1
0.4 TABLET SUBLINGUAL EVERY 5 MIN PRN
Status: DISCONTINUED | OUTPATIENT
Start: 2024-01-28 | End: 2024-01-30 | Stop reason: HOSPADM

## 2024-01-28 RX ORDER — ASPIRIN 81 MG/1
81 TABLET ORAL DAILY
Status: DISCONTINUED | OUTPATIENT
Start: 2024-01-28 | End: 2024-01-30 | Stop reason: HOSPADM

## 2024-01-28 RX ORDER — ACETAMINOPHEN 650 MG/1
650 SUPPOSITORY RECTAL EVERY 6 HOURS PRN
Status: DISCONTINUED | OUTPATIENT
Start: 2024-01-28 | End: 2024-01-30 | Stop reason: HOSPADM

## 2024-01-28 RX ORDER — POTASSIUM CHLORIDE 7.45 MG/ML
10 INJECTION INTRAVENOUS PRN
Status: DISCONTINUED | OUTPATIENT
Start: 2024-01-28 | End: 2024-01-30 | Stop reason: HOSPADM

## 2024-01-28 RX ORDER — ONDANSETRON 2 MG/ML
4 INJECTION INTRAMUSCULAR; INTRAVENOUS ONCE
Status: COMPLETED | OUTPATIENT
Start: 2024-01-28 | End: 2024-01-28

## 2024-01-28 RX ORDER — MAGNESIUM SULFATE IN WATER 40 MG/ML
2000 INJECTION, SOLUTION INTRAVENOUS PRN
Status: DISCONTINUED | OUTPATIENT
Start: 2024-01-28 | End: 2024-01-30 | Stop reason: HOSPADM

## 2024-01-28 RX ORDER — ONDANSETRON 2 MG/ML
4 INJECTION INTRAMUSCULAR; INTRAVENOUS EVERY 6 HOURS PRN
Status: DISCONTINUED | OUTPATIENT
Start: 2024-01-28 | End: 2024-01-30 | Stop reason: HOSPADM

## 2024-01-28 RX ORDER — SODIUM CHLORIDE 0.9 % (FLUSH) 0.9 %
5-40 SYRINGE (ML) INJECTION EVERY 12 HOURS SCHEDULED
Status: DISCONTINUED | OUTPATIENT
Start: 2024-01-28 | End: 2024-01-30

## 2024-01-28 RX ORDER — SODIUM CHLORIDE 0.9 % (FLUSH) 0.9 %
5-40 SYRINGE (ML) INJECTION PRN
Status: DISCONTINUED | OUTPATIENT
Start: 2024-01-28 | End: 2024-01-30

## 2024-01-28 RX ORDER — INSULIN GLARGINE 100 [IU]/ML
10 INJECTION, SOLUTION SUBCUTANEOUS NIGHTLY
Status: DISCONTINUED | OUTPATIENT
Start: 2024-01-28 | End: 2024-01-28

## 2024-01-28 RX ORDER — POTASSIUM CHLORIDE 20 MEQ/1
40 TABLET, EXTENDED RELEASE ORAL PRN
Status: DISCONTINUED | OUTPATIENT
Start: 2024-01-28 | End: 2024-01-30 | Stop reason: HOSPADM

## 2024-01-28 RX ORDER — METOPROLOL SUCCINATE 25 MG/1
25 TABLET, EXTENDED RELEASE ORAL DAILY
Status: DISCONTINUED | OUTPATIENT
Start: 2024-01-28 | End: 2024-01-30 | Stop reason: HOSPADM

## 2024-01-28 RX ORDER — SODIUM CHLORIDE 9 MG/ML
INJECTION, SOLUTION INTRAVENOUS CONTINUOUS
Status: DISCONTINUED | OUTPATIENT
Start: 2024-01-28 | End: 2024-01-30

## 2024-01-28 RX ORDER — ACETAMINOPHEN 325 MG/1
650 TABLET ORAL EVERY 6 HOURS PRN
Status: DISCONTINUED | OUTPATIENT
Start: 2024-01-28 | End: 2024-01-30 | Stop reason: HOSPADM

## 2024-01-28 RX ORDER — INSULIN LISPRO 100 [IU]/ML
0-4 INJECTION, SOLUTION INTRAVENOUS; SUBCUTANEOUS NIGHTLY
Status: DISCONTINUED | OUTPATIENT
Start: 2024-01-28 | End: 2024-01-30 | Stop reason: HOSPADM

## 2024-01-28 RX ORDER — GLUCAGON 1 MG/ML
1 KIT INJECTION PRN
Status: DISCONTINUED | OUTPATIENT
Start: 2024-01-28 | End: 2024-01-30 | Stop reason: HOSPADM

## 2024-01-28 RX ORDER — DIPHENHYDRAMINE HYDROCHLORIDE 50 MG/ML
50 INJECTION INTRAMUSCULAR; INTRAVENOUS ONCE
Status: COMPLETED | OUTPATIENT
Start: 2024-01-28 | End: 2024-01-28

## 2024-01-28 RX ORDER — SODIUM CHLORIDE 9 MG/ML
INJECTION, SOLUTION INTRAVENOUS PRN
Status: DISCONTINUED | OUTPATIENT
Start: 2024-01-28 | End: 2024-01-30

## 2024-01-28 RX ADMIN — ASPIRIN 81 MG: 81 TABLET, COATED ORAL at 16:10

## 2024-01-28 RX ADMIN — ATORVASTATIN CALCIUM 80 MG: 80 TABLET, FILM COATED ORAL at 22:03

## 2024-01-28 RX ADMIN — ONDANSETRON 4 MG: 2 INJECTION INTRAMUSCULAR; INTRAVENOUS at 13:24

## 2024-01-28 RX ADMIN — HEPARIN SODIUM 5000 UNITS: 5000 INJECTION INTRAVENOUS; SUBCUTANEOUS at 22:03

## 2024-01-28 RX ADMIN — METOPROLOL SUCCINATE 25 MG: 25 TABLET, EXTENDED RELEASE ORAL at 16:10

## 2024-01-28 RX ADMIN — HEPARIN SODIUM 5000 UNITS: 5000 INJECTION INTRAVENOUS; SUBCUTANEOUS at 16:11

## 2024-01-28 RX ADMIN — PIPERACILLIN AND TAZOBACTAM 3375 MG: 3; .375 INJECTION, POWDER, FOR SOLUTION INTRAVENOUS at 18:58

## 2024-01-28 RX ADMIN — DIPHENHYDRAMINE HYDROCHLORIDE 50 MG: 50 INJECTION INTRAMUSCULAR; INTRAVENOUS at 13:24

## 2024-01-28 RX ADMIN — SODIUM CHLORIDE: 9 INJECTION, SOLUTION INTRAVENOUS at 18:49

## 2024-01-28 RX ADMIN — HYDROCORTISONE SODIUM SUCCINATE 200 MG: 100 INJECTION, POWDER, FOR SOLUTION INTRAMUSCULAR; INTRAVENOUS at 13:24

## 2024-01-28 RX ADMIN — SODIUM CHLORIDE, PRESERVATIVE FREE 10 ML: 5 INJECTION INTRAVENOUS at 19:20

## 2024-01-28 RX ADMIN — SODIUM CHLORIDE, PRESERVATIVE FREE 10 ML: 5 INJECTION INTRAVENOUS at 22:03

## 2024-01-28 ASSESSMENT — LIFESTYLE VARIABLES: HOW OFTEN DO YOU HAVE A DRINK CONTAINING ALCOHOL: NEVER

## 2024-01-28 NOTE — ED NOTES
Report called to [place] on 1 west. Patient and her family updated on admission plan, room assignment. Patient in no distress on transfer to 1 west. Telemetry monitor applied.

## 2024-01-28 NOTE — CONSULTS
Consults    Patient Name: Aurelia Thornton  Admit Date: 2024 11:39 AM  MR #: [id number]  : 1950    Attending Physician: Anthony Kelsey MD  Reason for consult: NSTEMI    History of Presenting Illness:      Aurelia Thornton is a 73 y.o. female on hospital day 0 with a history of .   History Obtained From:  patient, electronic medical record    Presents to ER for generalized not feeling well since this past Thursday.  It appears that she told the ER provider that she had Chest discomfort but denies having CP nor SOB.  She felt weak and just not her self.  She was taken off Lisinopril 3 months ago but then she started to take it again on Thursday because she though it would help her.  She was suppose to take Valsartan instead 3 mo ago but she never did start.     HS Troponin 2256.  D Dimer elevated.    She has no prior known CAD.     She is is ASH Cr 2.04     ECG SR 71 w OIWMI    Seh is on No Supplemental O2 in ER and sats are 96-98% no fever.    History:      EKG:  Past Medical History:   Diagnosis Date    A-fib ([facility])     Chronic diastolic heart failure (HCC) 10/8/2015    CKD (chronic kidney disease), stage III ([facility]) 10/8/2015    Coronary artery disease involving native coronary artery of native heart without angina pectoris 10/8/2015    Essential hypertension 10/8/2015    Family history of coronary artery disease 10/8/2015    History of non-ST elevation myocardial infarction (NSTEMI) 10/8/2015    HTN (hypertension)     Hyperlipidemia 10/8/2015    PAD (peripheral artery disease) ([facility]) 10/8/2015     Past Surgical History:   Procedure Laterality Date    CARDIAC CATHETERIZATION  09/10/15    [facility] DO    CARDIAC CATHETERIZATION      TOE AMPUTATION Right 2016       Family History  History reviewed. No pertinent family history.  [] Unable to obtain due to ventilated and/ or neurologic status    Social History     Socioeconomic History    Marital status:      Spouse name: Not on file

## 2024-01-28 NOTE — ED TRIAGE NOTES
Pt to ED due to illness since Thursday. Pt states she has a runny nose, congestion, cough, and some chest discomfort. Pt states she has been constipated since Thursday as well. Pt denies any chest discomfort at this time and states it only occurs when she moves.

## 2024-01-28 NOTE — ED PROVIDER NOTES
Place 1 tablet under the tongue every 5 minutes as needed    SIMVASTATIN (ZOCOR) 40 MG TABLET    Take 1 tablet by mouth nightly       ALLERGIES     Latex, Iodinated contrast media, Pravastatin, and Vytorin [ezetimibe-simvastatin]    FAMILY HISTORY     History reviewed. No pertinent family history.       SOCIAL HISTORY       Social History     Socioeconomic History    Marital status:      Spouse name: None    Number of children: None    Years of education: None    Highest education level: None   Tobacco Use    Smoking status: Never   Substance and Sexual Activity    Alcohol use: No     Alcohol/week: 0.0 standard drinks of alcohol    Drug use: No       SCREENINGS         Banner Coma Scale  Eye Opening: Spontaneous  Best Verbal Response: Oriented  Best Motor Response: Obeys commands  Banner Coma Scale Score: 15                     CIWA Assessment  BP: 130/77  Pulse: 89                 PHYSICAL EXAM    (up to 7 for level 4, 8 or more for level 5)     ED Triage Vitals [01/28/24 1138]   BP Temp Temp Source Pulse Respirations SpO2 Height Weight   99/61 97.3 °F (36.3 °C) Temporal 75 18 98 % -- --       Physical Exam  Vitals and nursing note reviewed.   Constitutional:       General: She is not in acute distress.     Appearance: Normal appearance. She is well-developed.   HENT:      Head: Normocephalic and atraumatic.      Nose: Nose normal.      Mouth/Throat:      Mouth: Mucous membranes are moist.   Eyes:      General:         Right eye: No discharge.         Left eye: No discharge.      Conjunctiva/sclera: Conjunctivae normal.   Neck:      Vascular: No JVD.      Trachea: No tracheal deviation.   Cardiovascular:      Rate and Rhythm: Normal rate.   Pulmonary:      Effort: Pulmonary effort is normal.      Breath sounds: Normal breath sounds.   Abdominal:      General: Bowel sounds are normal. There is distension.      Palpations: Abdomen is soft. There is no mass.      Tenderness: There is no abdominal tenderness.  2056  BNP:19,585  D-dimer:1.75   COVID-19: Negative  Influenza: Negative  Chest x-ray: Independently reviewed by myself and is negative for acute cardiopulmonary disease process.  No indication of congestive heart failure.  Abdominal x-ray: Negative for constipation.  No abnormal gas pattern.  CT abdomen: shows acute cholecystitis   CT chest: shows no acute process   Patient will be admitted to hospital for acute cholecystitis, NSTEMI, elevated troponin, ASH. Spoke to Dr. Baum regarding patient and is in agreement with medical admit with cardiology on consult. Spoke to Dr. Gonzalez from general surgery who recommends patient have an ultrasound of gallbladder for evaluation of acute cholecystitis, however due to it being the weekend this is not an emergent study and will not be able to get this done today. Plan for ultrasound tomorrow morning.       Amount and/or Complexity of Data Reviewed  Labs: ordered.  Radiology: ordered.  ECG/medicine tests: ordered. Decision-making details documented in ED Course.    Risk  Prescription drug management.  Decision regarding hospitalization.            REASSESSMENT     ED Course as of 01/28/24 1635   Sun Jan 28, 2024   1430 EKG 12 Lead  EKG showing sinus rhythm with first-degree AVB with PACs.  Rate of 71 bpm.  Normal axis.  Otherwise normal intervals.  ST depression and T wave inversions in 1 and aVL.  No clear ST elevation. [NA]      ED Course User Index  [NA] Anthony Kelsey MD     Spoke to Dr. Baum from cardiology at 225 regarding this patient.  He is in agreement to medical admission.  Hospitalist paged at 225.  Waiting return call.    General surgery paged at 1620 regarding ct scan findings of acute cholecystitis. Dr. Gonzalez requested that patient not receive IV zosyn at this time and will obtain ultrasound tomorrow morning for evaluation of possible acute cholecystitis.     Hospitalist page at 1623 for admission. Spoke to Dr. Guevara who has accepted the patient. Plan

## 2024-01-28 NOTE — PROGRESS NOTES
DVT / VTE PROPHYLAXIS EVALUATION    Recent Labs     01/28/24  1200 01/28/24  1349   BUN  --  33*   CREATININE  --  2.04*     --    HGB 10.0*  --    HCT 30.1*  --      ADMITTING DX OR CHIEF COMPLAINT? illness  WARFARIN? DOAC'S? no  ANY APPARENT BLEEDING? no  SCHEDULED SURGERY? no     If yes to following, excluded from auto adjustment in Table 1 of policy - please contact provider with recommendations as appropriate.  Include condition/exception in scratch notes. Yes No   Trauma Service or Ortho Surgery []  []    COVID []  []    Pregnancy []  []        Current order:  UFH 5000 units SUBQ 3 times daily       ,    CrCl cannot be calculated (Unknown ideal weight.).    Plan:  No intervention recommended, continue current VTE prophylaxis as ordered     Patient Weight (kg)      50.9 and below .9 101-150.9 151-174.9 175 or greater   Estimated   CrCl  (ml/min) 30 or greater []   30 mg   SUBQ daily   []   40 mg   SUBQ daily (or 30 mg BID for orthopedic cases) []  30 mg SUBQ   BID  []  40 mg   SUBQ   BID []  60mg SUBQ BID    15-29.9 []  UFH 5000   units SUBQ BID []  30 mg   SUBQ daily [] 30 mg SUBQ   daily []  40 mg SUBQ   daily [] 60 mg SUBQ   daily    Less than 15 or dialysis []  UFH 5000   units SUBQ BID [x] UFH 5000 units SUBQ TID []  UFH 7500   units   SUBQ TID     Monitor renal function     Kymberly Saez, [facility] PharmD

## 2024-01-29 ENCOUNTER — APPOINTMENT (OUTPATIENT)
Dept: ULTRASOUND IMAGING | Age: 74
DRG: 215 | End: 2024-01-29
Payer: MEDICARE

## 2024-01-29 ENCOUNTER — APPOINTMENT (OUTPATIENT)
Age: 74
DRG: 215 | End: 2024-01-29
Attending: INTERNAL MEDICINE
Payer: MEDICARE

## 2024-01-29 LAB
ALBUMIN SERPL-MCNC: 3.3 G/DL (ref 3.5–4.6)
ALP SERPL-CCNC: 52 U/L (ref 40–130)
ALT SERPL-CCNC: 40 U/L (ref 0–33)
ANION GAP SERPL CALCULATED.3IONS-SCNC: 15 MEQ/L (ref 9–15)
AST SERPL-CCNC: 39 U/L (ref 0–35)
BASOPHILS # BLD: 0 K/UL (ref 0–0.2)
BASOPHILS NFR BLD: 0.2 %
BILIRUB SERPL-MCNC: 0.6 MG/DL (ref 0.2–0.7)
BUN SERPL-MCNC: 33 MG/DL (ref 8–23)
CALCIUM SERPL-MCNC: 8.5 MG/DL (ref 8.5–9.9)
CHLORIDE SERPL-SCNC: 94 MEQ/L (ref 95–107)
CHOLEST SERPL-MCNC: 129 MG/DL (ref 0–199)
CO2 SERPL-SCNC: 20 MEQ/L (ref 20–31)
CREAT SERPL-MCNC: 1.53 MG/DL (ref 0.5–0.9)
ECHO AO ROOT DIAM: 3.4 CM
ECHO AO ROOT INDEX: 1.95 CM/M2
ECHO AV AREA PEAK VELOCITY: 1.5 CM2
ECHO AV AREA VTI: 1.5 CM2
ECHO AV AREA/BSA PEAK VELOCITY: 0.9 CM2/M2
ECHO AV AREA/BSA VTI: 0.9 CM2/M2
ECHO AV CUSP MM: 2.2 CM
ECHO AV MEAN GRADIENT: 1 MMHG
ECHO AV MEAN VELOCITY: 0.5 M/S
ECHO AV PEAK GRADIENT: 2 MMHG
ECHO AV PEAK VELOCITY: 0.9 M/S
ECHO AV VELOCITY RATIO: 0.67
ECHO AV VTI: 19.3 CM
ECHO BSA: 1.76 M2
ECHO LA DIAMETER INDEX: 2.13 CM/M2
ECHO LA DIAMETER: 3.7 CM
ECHO LA TO AORTIC ROOT RATIO: 1.09
ECHO LA VOL A-L A2C: 108 ML (ref 22–52)
ECHO LA VOL A-L A4C: 89 ML (ref 22–52)
ECHO LA VOL MOD A2C: 104 ML (ref 22–52)
ECHO LA VOL MOD A4C: 86 ML (ref 22–52)
ECHO LA VOLUME AREA LENGTH: 112 ML
ECHO LA VOLUME INDEX A-L A2C: 62 ML/M2 (ref 16–34)
ECHO LA VOLUME INDEX A-L A4C: 51 ML/M2 (ref 16–34)
ECHO LA VOLUME INDEX AREA LENGTH: 64 ML/M2 (ref 16–34)
ECHO LA VOLUME INDEX MOD A2C: 60 ML/M2 (ref 16–34)
ECHO LA VOLUME INDEX MOD A4C: 49 ML/M2 (ref 16–34)
ECHO LV E' LATERAL VELOCITY: 5 CM/S
ECHO LV E' SEPTAL VELOCITY: 3 CM/S
ECHO LV EDV A2C: 273 ML
ECHO LV EDV A4C: 209 ML
ECHO LV EDV BP: 246 ML (ref 56–104)
ECHO LV EDV INDEX A4C: 120 ML/M2
ECHO LV EDV INDEX BP: 141 ML/M2
ECHO LV EDV NDEX A2C: 157 ML/M2
ECHO LV EJECTION FRACTION A2C: 30 %
ECHO LV EJECTION FRACTION A4C: 6 %
ECHO LV EJECTION FRACTION BIPLANE: 16 % (ref 55–100)
ECHO LV ESV A2C: 190 ML
ECHO LV ESV A4C: 221 ML
ECHO LV ESV BP: 206 ML (ref 19–49)
ECHO LV ESV INDEX A2C: 109 ML/M2
ECHO LV ESV INDEX A4C: 127 ML/M2
ECHO LV ESV INDEX BP: 118 ML/M2
ECHO LV FRACTIONAL SHORTENING: 6 % (ref 28–44)
ECHO LV INTERNAL DIMENSION DIASTOLE INDEX: 3.1 CM/M2
ECHO LV INTERNAL DIMENSION DIASTOLIC: 5.4 CM (ref 3.9–5.3)
ECHO LV INTERNAL DIMENSION SYSTOLIC INDEX: 2.93 CM/M2
ECHO LV INTERNAL DIMENSION SYSTOLIC: 5.1 CM
ECHO LV IVSD: 1.3 CM (ref 0.6–0.9)
ECHO LV IVSS: 1.3 CM
ECHO LV MASS 2D: 295.6 G (ref 67–162)
ECHO LV MASS INDEX 2D: 169.9 G/M2 (ref 43–95)
ECHO LV POSTERIOR WALL DIASTOLIC: 1.3 CM (ref 0.6–0.9)
ECHO LV POSTERIOR WALL SYSTOLIC: 1.4 CM
ECHO LV RELATIVE WALL THICKNESS RATIO: 0.48
ECHO LVOT AREA: 2.3 CM2
ECHO LVOT AV VTI INDEX: 0.68
ECHO LVOT DIAM: 1.7 CM
ECHO LVOT MEAN GRADIENT: 1 MMHG
ECHO LVOT PEAK GRADIENT: 1 MMHG
ECHO LVOT PEAK VELOCITY: 0.6 M/S
ECHO LVOT STROKE VOLUME INDEX: 17.1 ML/M2
ECHO LVOT SV: 29.7 ML
ECHO LVOT VTI: 13.1 CM
ECHO MV A VELOCITY: 0.84 M/S
ECHO MV E DECELERATION TIME (DT): 146.8 MS
ECHO MV E VELOCITY: 1.1 M/S
ECHO MV E/A RATIO: 1.31
ECHO MV E/E' LATERAL: 22
ECHO MV E/E' RATIO (AVERAGED): 29.33
ECHO RV INTERNAL DIMENSION: 2.1 CM
ECHO RV TAPSE: 1.3 CM (ref 1.7–?)
EKG ATRIAL RATE: 71 BPM
EKG ATRIAL RATE: 76 BPM
EKG P AXIS: 59 DEGREES
EKG P AXIS: 71 DEGREES
EKG P-R INTERVAL: 214 MS
EKG P-R INTERVAL: 216 MS
EKG Q-T INTERVAL: 402 MS
EKG Q-T INTERVAL: 408 MS
EKG QRS DURATION: 114 MS
EKG QRS DURATION: 114 MS
EKG QTC CALCULATION (BAZETT): 436 MS
EKG QTC CALCULATION (BAZETT): 459 MS
EKG R AXIS: -49 DEGREES
EKG R AXIS: 32 DEGREES
EKG T AXIS: 121 DEGREES
EKG T AXIS: 145 DEGREES
EKG VENTRICULAR RATE: 71 BPM
EKG VENTRICULAR RATE: 76 BPM
EOSINOPHIL # BLD: 0 K/UL (ref 0–0.7)
EOSINOPHIL NFR BLD: 0 %
ERYTHROCYTE [DISTWIDTH] IN BLOOD BY AUTOMATED COUNT: 12.6 % (ref 11.5–14.5)
GLOBULIN SER CALC-MCNC: 2.8 G/DL (ref 2.3–3.5)
GLUCOSE BLD-MCNC: 134 MG/DL (ref 70–99)
GLUCOSE BLD-MCNC: 177 MG/DL (ref 70–99)
GLUCOSE BLD-MCNC: 271 MG/DL (ref 70–99)
GLUCOSE BLD-MCNC: 316 MG/DL (ref 70–99)
GLUCOSE SERPL-MCNC: 178 MG/DL (ref 70–99)
HBA1C MFR BLD: 7.4 % (ref 4.8–5.9)
HCT VFR BLD AUTO: 28.5 % (ref 37–47)
HDLC SERPL-MCNC: 42 MG/DL (ref 40–59)
HGB BLD-MCNC: 9.6 G/DL (ref 12–16)
LDLC SERPL CALC-MCNC: 72 MG/DL (ref 0–129)
LYMPHOCYTES # BLD: 0.9 K/UL (ref 1–4.8)
LYMPHOCYTES NFR BLD: 16.1 %
MCH RBC QN AUTO: 29 PG (ref 27–31.3)
MCHC RBC AUTO-ENTMCNC: 33.7 % (ref 33–37)
MCV RBC AUTO: 86.1 FL (ref 79.4–94.8)
MONOCYTES # BLD: 0.3 K/UL (ref 0.2–0.8)
MONOCYTES NFR BLD: 5.9 %
NEUTROPHILS # BLD: 4.2 K/UL (ref 1.4–6.5)
NEUTS SEG NFR BLD: 77.2 %
PERFORMED ON: ABNORMAL
PLATELET # BLD AUTO: 174 K/UL (ref 130–400)
POTASSIUM SERPL-SCNC: 5.2 MEQ/L (ref 3.4–4.9)
PROT SERPL-MCNC: 6.1 G/DL (ref 6.3–8)
RBC # BLD AUTO: 3.31 M/UL (ref 4.2–5.4)
SODIUM SERPL-SCNC: 129 MEQ/L (ref 135–144)
TRIGL SERPL-MCNC: 73 MG/DL (ref 0–150)
TROPONIN, HIGH SENSITIVITY: 1329 NG/L (ref 0–19)
WBC # BLD AUTO: 5.4 K/UL (ref 4.8–10.8)

## 2024-01-29 PROCEDURE — 36415 COLL VENOUS BLD VENIPUNCTURE: CPT

## 2024-01-29 PROCEDURE — 80061 LIPID PANEL: CPT

## 2024-01-29 PROCEDURE — 2060000000 HC ICU INTERMEDIATE R&B

## 2024-01-29 PROCEDURE — 6370000000 HC RX 637 (ALT 250 FOR IP): Performed by: INTERNAL MEDICINE

## 2024-01-29 PROCEDURE — 80053 COMPREHEN METABOLIC PANEL: CPT

## 2024-01-29 PROCEDURE — 93005 ELECTROCARDIOGRAM TRACING: CPT | Performed by: INTERNAL MEDICINE

## 2024-01-29 PROCEDURE — 6360000002 HC RX W HCPCS: Performed by: INTERNAL MEDICINE

## 2024-01-29 PROCEDURE — 85025 COMPLETE CBC W/AUTO DIFF WBC: CPT

## 2024-01-29 PROCEDURE — 99232 SBSQ HOSP IP/OBS MODERATE 35: CPT | Performed by: INTERNAL MEDICINE

## 2024-01-29 PROCEDURE — 84484 ASSAY OF TROPONIN QUANT: CPT

## 2024-01-29 PROCEDURE — 6360000004 HC RX CONTRAST MEDICATION: Performed by: INTERNAL MEDICINE

## 2024-01-29 PROCEDURE — 93306 TTE W/DOPPLER COMPLETE: CPT | Performed by: INTERNAL MEDICINE

## 2024-01-29 PROCEDURE — 2580000003 HC RX 258: Performed by: INTERNAL MEDICINE

## 2024-01-29 PROCEDURE — 76705 ECHO EXAM OF ABDOMEN: CPT

## 2024-01-29 PROCEDURE — C8929 TTE W OR WO FOL WCON,DOPPLER: HCPCS

## 2024-01-29 RX ADMIN — PERFLUTREN 1.5 ML: 6.52 INJECTION, SUSPENSION INTRAVENOUS at 11:19

## 2024-01-29 RX ADMIN — INSULIN HUMAN 10 UNITS: 100 INJECTION, SUSPENSION SUBCUTANEOUS at 09:13

## 2024-01-29 RX ADMIN — HEPARIN SODIUM 5000 UNITS: 5000 INJECTION INTRAVENOUS; SUBCUTANEOUS at 20:38

## 2024-01-29 RX ADMIN — PIPERACILLIN AND TAZOBACTAM 3375 MG: 3; .375 INJECTION, POWDER, FOR SOLUTION INTRAVENOUS at 00:18

## 2024-01-29 RX ADMIN — SODIUM CHLORIDE: 9 INJECTION, SOLUTION INTRAVENOUS at 20:40

## 2024-01-29 RX ADMIN — INSULIN HUMAN 10 UNITS: 100 INJECTION, SUSPENSION SUBCUTANEOUS at 17:16

## 2024-01-29 RX ADMIN — LEVOTHYROXINE SODIUM 175 MCG: 0.15 TABLET ORAL at 08:52

## 2024-01-29 RX ADMIN — SODIUM CHLORIDE, PRESERVATIVE FREE 10 ML: 5 INJECTION INTRAVENOUS at 20:40

## 2024-01-29 RX ADMIN — HEPARIN SODIUM 5000 UNITS: 5000 INJECTION INTRAVENOUS; SUBCUTANEOUS at 14:52

## 2024-01-29 RX ADMIN — ASPIRIN 81 MG: 81 TABLET, COATED ORAL at 08:52

## 2024-01-29 RX ADMIN — INSULIN LISPRO 4 UNITS: 100 INJECTION, SOLUTION INTRAVENOUS; SUBCUTANEOUS at 16:28

## 2024-01-29 RX ADMIN — PIPERACILLIN AND TAZOBACTAM 3375 MG: 3; .375 INJECTION, POWDER, FOR SOLUTION INTRAVENOUS at 08:49

## 2024-01-29 RX ADMIN — POLYETHYLENE GLYCOL 3350 17 G: 17 POWDER, FOR SOLUTION ORAL at 08:52

## 2024-01-29 RX ADMIN — ATORVASTATIN CALCIUM 80 MG: 80 TABLET, FILM COATED ORAL at 20:38

## 2024-01-29 RX ADMIN — HEPARIN SODIUM 5000 UNITS: 5000 INJECTION INTRAVENOUS; SUBCUTANEOUS at 06:33

## 2024-01-29 RX ADMIN — METOPROLOL SUCCINATE 25 MG: 25 TABLET, EXTENDED RELEASE ORAL at 08:52

## 2024-01-29 RX ADMIN — INSULIN LISPRO 6 UNITS: 100 INJECTION, SOLUTION INTRAVENOUS; SUBCUTANEOUS at 11:52

## 2024-01-29 ASSESSMENT — ENCOUNTER SYMPTOMS
COUGH: 0
SHORTNESS OF BREATH: 0
NAUSEA: 0
RESPIRATORY NEGATIVE: 1
CHEST TIGHTNESS: 0
WHEEZING: 0
STRIDOR: 0
BLOOD IN STOOL: 0
EYES NEGATIVE: 1
GASTROINTESTINAL NEGATIVE: 1

## 2024-01-29 NOTE — CARE COORDINATION
Case Management Assessment  Initial Evaluation    Date/Time of Evaluation: 1/28/2024 9:29 PM  Assessment Completed by: Kianna Walton RN    If patient is discharged prior to next notation, then this note serves as note for discharge by case management.    Patient Name: Aurelia Thornton                   YOB: 1950  Diagnosis: Cholecystitis [K81.9]  Elevated troponin [R79.89]  NSTEMI (non-ST elevated myocardial infarction) (HCC) [I21.4]  ASH (acute kidney injury) (HCC) [N17.9]                   Date / Time: 1/28/2024 11:39 AM    Patient Admission Status: Inpatient   Readmission Risk (Low < 19, Mod (19-27), High > 27): No data recorded  Current PCP: Brittnee Carrizales MD  PCP verified by CM? (P) Yes    Chart Reviewed: Yes      History Provided by: (P) Patient  Patient Orientation: (P) Alert and Oriented, Person, Place, Situation, Self    Patient Cognition: (P) Alert    Hospitalization in the last 30 days (Readmission):  No    If yes, Readmission Assessment in  Navigator will be completed.    Advance Directives:      Code Status: Full Code   Patient's Primary Decision Maker is: (P) Legal Next of Kin (sister Stanton, kameron Marissa.)    Primary Decision Maker: Stanton Sylvester - Brother/Sister - [phone]    Discharge Planning:    Patient lives with: (P) Alone Type of Home: (P) Apartment  Primary Care Giver: (P) Self  Patient Support Systems include: (P) Family Members   Current Financial resources: (P) Medicare  Current community resources: (P) None  Current services prior to admission: (P) None            Current DME:              Type of Home Care services:  (P) None    ADLS  Prior functional level: (P) Independent in ADLs/IADLs  Current functional level: (P) Independent in ADLs/IADLs    PT AM-PAC:   /24  OT AM-PAC:   /24    Family can provide assistance at DC: (P) Yes  Would you like Case Management to discuss the discharge plan with any other family members/significant others, and if so, who? (P)

## 2024-01-29 NOTE — CONSULTS
CATHETERIZATION     • TOE AMPUTATION Right 02/2016     Prior to Admission medications    Medication Sig Start Date End Date Taking? Authorizing Provider   isosorbide mononitrate (IMDUR) 30 MG extended release tablet TAKE 1 TABLET BY MOUTH DAILY 4/24/18   Bradford Jordan DO   simvastatin (ZOCOR) 40 MG tablet Take 1 tablet by mouth nightly 3/29/17   Luzma Simpson PA   glucose blood VI test strips (ASCENSIA AUTODISC VI;ONE TOUCH ULTRA TEST VI) strip TEST BLOOD SUGAR TWICE A DAY 10/5/16   Ty Hull MD   FREESTYLE LANCETS MISC USE 3 TIMES DAILY 9/27/16   Ty Hull MD   lisinopril (PRINIVIL;ZESTRIL) 20 MG tablet TAKE 1 TABLET BY MOUTH IN THE MORNING AND AT BEDTIME  Patient not taking: Reported on 1/28/2024 9/22/16   Luzma Simpson PA   carvedilol (COREG) 25 MG tablet TAKE 1 TABLET BY MOUTH TWICE A DAY - HOLD FOR SBP <100 OR HR <60 9/14/16   Luzma Simpson PA   furosemide (LASIX) 40 MG tablet Take 1 tablet by mouth daily  Patient taking differently: Take 0.5 tablets by mouth daily 10/15/15   Bradford Jordan DO   aspirin 325 MG EC tablet Take 1 tablet by mouth daily    Ty Hull MD   levothyroxine (SYNTHROID) 175 MCG tablet Take 1 tablet by mouth daily    Ty Hull MD   insulin NPH (HUMULIN N;NOVOLIN N) 100 UNIT/ML injection vial Inject 25 Units into the skin 2 times daily (before meals)    Ty Hull MD   nitroGLYCERIN (NITROSTAT) 0.4 MG SL tablet Place 1 tablet under the tongue every 5 minutes as needed  Patient not taking: Reported on 1/28/2024    Ty Hull MD     Allergies   Allergen Reactions   • Latex    • Iodinated Contrast Media    • Pravastatin    • Vytorin [Ezetimibe-Simvastatin]      History reviewed. No pertinent family history.    Social History     Tobacco Use   • Smoking status: Never   Substance Use Topics   • Alcohol use: No     Alcohol/week: 0.0 standard drinks of alcohol   • Drug use: No     Review of  right upper quadrant, if acute cholecystitis is of clinical concern.   2. Mild mediastinal lymphadenopathy, slightly increased compared to the previous report of 2017.   3. Cardiomegaly with triple-vessel coronary artery calcification.   4. No acute cardiopulmonary process.   5. Large calcified uterine fibroids.     RECOMMENDATIONS: Suggest ultrasound examination of the right upper quadrant to evaluate for possible acute cholecystitis.         RUQ ultrasound  1/29/24  FINDINGS:  LIVER:  The liver demonstrates normal echogenicity without evidence of intrahepatic biliary ductal dilatation.  BILIARY SYSTEM:  Gallbladder has a non mobile calculus in the gallbladder neck..  Negative sonographic Bryant's sign.  There is no wall thickening. Common bile duct is within normal limits measuring 5 mm.  RIGHT KIDNEY: The right kidney is grossly unremarkable without evidence of hydronephrosis. It measures 9.8 x 5.8 cm.  PANCREAS:  Visualized portions of the pancreas are unremarkable.  OTHER: No evidence of right upper quadrant ascites.     IMPRESSION:  Cholelithiasis with no sonographic evidence of acute cholecystitis.        ASSESSMENT AND PLAN:   Aurelia Thornton is a 73 y.o. female with a PMH of HTN, HLD, NSTEMI, chronic diastolic HF, CAD, Afib, DM, CKD3, and PAD who presented to the ER with a 3 day history of a nonproductive cough, congestion, chest discomfort, and DAMICO. She was found to have an ASH, elevated troponins with a NSTEMI, and CT AP revealed a gallstones in the setting of a thickened gallbladder wall.  Subsequent RUQ ultrasound revealed no evidence of cholecystitis.  No leukocytosis or elevated LFTs.  Clinical presentation not consistent with acute cholecystitis.    Patient incidentally found to have a large gallstone on imaging.  Denies any abdominal symptoms correlating to symptomatic cholelithiasis.  Given her NSTEMI and low EF (10-15%) on echo today, do not recommend any surgical intervention for her

## 2024-01-29 NOTE — ACP (ADVANCE CARE PLANNING)
Advance Care Planning     Advance Care Planning Activator (Inpatient)  Conversation Note      Date of ACP Conversation: 1/28/2024     Conversation Conducted with: Patient with Decision Making Capacity    ACP Activator: Kianna Walton RN    The patient was given information on advance directives per her request, pastoral care is to follow up for any questions.    Health Care Decision Maker:     Current Designated Health Care Decision Maker:     Primary Decision Maker: Stanton Sylvester - Brother/Sister - [phone]    Secondary Decision Maker: Marissa Sylvester - Niece/Nephew - [phone]

## 2024-01-29 NOTE — CARE COORDINATION
Definition and description of a heart attack explained.  Brief overview of the heart anatomy reviewed with pt.  CAD progression discussed.  During a MI, lack of oxygen and damage to heart muscle explained.  Symptoms of a MI reviewed.  Pt. reports experiencing: had some chest pain, not feeling well  Post MI complications reviewed including arrhythmias, decreased cardiac output, and inflammation (pericarditis).  Hospital course reviewed, including testing: Lab work, B/P, ECG, ECHO, Stress testing, and Heart Cath.  Treatments also reviewed from medication, angioplasty, and stenting, to CABG.  Patient had: plans for heart cath tomorrow.   Plan post discharge includes follow up with cardiology and cardiac rehab  Physical activity discussed including cardiac rehab. Pt advised to follow their physician's discharge instructions for activity restrictions, if any.  Risk factors reviewed including: smoking, high cholesterol, HTN, DM, obesity, sedentary lifestyle, and stress. Pt. encouraged to make lifestyle changes to improve their health and lower these risk factors.  Stress and depression were also discussed. S/S depression reviewed as well as encouragement to talk with someone if symptoms occur.  Importance of follow up with the cardiologist reinforced.  MI Zone booklet also reviewed. Goal is to keep patient in the \"green\" zone. she verbalizes understanding to call the doctor ASAP when experiencing symptoms in the \"yellow\" zone. Instructed to call 911 when S/S of \"red\" zone begin. Reminder to never drive after taking nitroglycerine.  Copy of MI booklet and zone pamphlet provided to the patient for review.  Offer for patient to verbalize any questions. All questions answered and patient denies further questions at this time.

## 2024-01-29 NOTE — CONSULTS
Spiritual Care Services     Summary of Visit:   visited patient for an advanced directive consult. Patient given packet in ED. Patient said she had not had a chance to read over the forms and would probably wait until she went home.     Encounter Summary  Encounter Overview/Reason : Advance Care Planning  Service Provided For:: Patient  Referral/Consult From:: Nurse  Support System: Family members  Complexity of Encounter: Moderate  Begin Time: 1545  End Time : 1615  Total Time Calculated: 30 min                          Advance Care Planning  Type: ACP conversation    Spiritual Assessment/Intervention/Outcomes:                     Care Plan:         Assist with advanced directives as requested      Spiritual Care Services   Electronically signed by Chaplain Renetta on 1/29/2024 at 4:28 PM.    To reach a  for emotional and spiritual support, place an EPIC consult request.   If a  is needed immediately, dial “0” and ask to page the on-call .

## 2024-01-29 NOTE — PROGRESS NOTES
Hospitalist Progress Note      PCP: Brittnee Carrizales MD    Date of Admission: 1/28/2024    Chief Complaint:    Chief Complaint   Patient presents with    Illness     Cough, congestion, chest discomfort since thursday    Constipation       Subjective:  Patient denies fevers, chills, sweats, CP, SOB, diarrhea or burning micturition. Feels much better today. 12 point ROS negative other than mentioned above     Medications:  Reviewed    Infusion Medications    dextrose      sodium chloride      sodium chloride 100 mL/hr at 01/28/24 1849     Scheduled Medications    heparin (porcine)  5,000 Units SubCUTAneous 3 times per day    metoprolol succinate  25 mg Oral Daily    aspirin  81 mg Oral Daily    atorvastatin  80 mg Oral Nightly    levothyroxine  175 mcg Oral Daily    sodium chloride flush  5-40 mL IntraVENous 2 times per day    insulin lispro  0-8 Units SubCUTAneous TID WC    insulin lispro  0-4 Units SubCUTAneous Nightly    insulin NPH  10 Units SubCUTAneous BID AC     PRN Meds: glucose, dextrose bolus **OR** dextrose bolus, glucagon (rDNA), dextrose, sodium chloride flush, sodium chloride, potassium chloride **OR** potassium alternative oral replacement **OR** potassium chloride, ondansetron **OR** ondansetron, acetaminophen **OR** acetaminophen, polyethylene glycol, nitroGLYCERIN, magnesium sulfate      Intake/Output Summary (Last 24 hours) at 1/29/2024 1641  Last data filed at 1/29/2024 0616  Gross per 24 hour   Intake 0 ml   Output --   Net 0 ml       Exam:    /71   Pulse 96   Temp 97.7 °F (36.5 °C) (Oral)   Resp 16   Ht 1.626 m (5' 4.02\")   Wt 68.9 kg (151 lb 14.4 oz)   SpO2 95%   BMI 26.06 kg/m²     General appearance: No apparent distress, appears stated age and cooperative.  HEENT:  Conjunctivae/corneas clear.  Neck: Trachea midline.  Respiratory:  Normal respiratory effort. Clear to auscultation  Cardiovascular: Regular rate and rhythm  Abdomen: Soft, non-tender, non-distended with normal bowel  PELVIS WO CONTRAST Additional Contrast? None   Final Result   1. Cholelithiasis with gallbladder wall thickening suggesting acute   cholecystitis. Suggest correlation with ultrasound of the right upper   quadrant, if acute cholecystitis is of clinical concern.   2. Mild mediastinal lymphadenopathy, slightly increased compared to the   previous report of 2017.   3. Cardiomegaly with triple-vessel coronary artery calcification.   4. No acute cardiopulmonary process.   5. Large calcified uterine fibroids.      RECOMMENDATIONS:   Suggest ultrasound examination of the right upper quadrant to evaluate for   possible acute cholecystitis.         XR ABDOMEN (2 VIEWS)   Final Result   1. Nonobstructive bowel gas pattern.   2. Interval increase in calcifications within the central pelvis, most likely   representing increasing calcified uterine leiomyomata.  This is when compared   to the previous study performed 10/03/2005.         XR CHEST (2 VW)   Final Result   1. Cardiomegaly.  There are no findings to suggest CHF or pneumonia.           Assessment/Plan:    NSTEMI; New found chronic combined systolic and diastolic CHF:  Will need LHC; hopeful for tomorrow based on renal function; LVEF found to be 15%  ASH on CKD; hyponatremia:  Improving with IVF; continue.  ?Acute cholecystitis:  Ruled out  PAD:  Continue home meds  Mild mediastinal lymphadenopathy:  outpatient follow up  DMII:  SSI  Hypothyroidism:  Continue home meds   Functional Status: Fall precautions. Up with assistance. PT OT  Diet: Cardiac Diabetic   DVT ppx: Lovenox SCDs  Disposition: Dependent on hospital course. Will discharge once medically stable. SW on board for discharge planning.    Active Hospital Problems    Diagnosis Date Noted    NSTEMI (non-ST elevated myocardial infarction) (HCC) [I21.4] 01/28/2024        Additional work up or/and treatment plan may be added today or then after based on clinical progression. I am managing a portion of pt care. Some

## 2024-01-29 NOTE — PROGRESS NOTES
PROGRESS NOTE      Patient Name: Aurelia Thornton  Admit Date: 2024 11:39 AM  MR #: [id number]  : 1950    Attending Physician: Christoph Guevara MD  Reason for consult: NSTEMI    History of Presenting Illness:      Aurelia Thornton is a 73 y.o. female on hospital day 1 with a history of .   History Obtained From:  patient, electronic medical record    Presents to ER for generalized not feeling well since this past Thursday.  It appears that she told the ER provider that she had Chest discomfort but denies having CP nor SOB.  She felt weak and just not her self.  She was taken off Lisinopril 3 months ago but then she started to take it again on Thursday because she though it would help her.  She was suppose to take Valsartan instead 3 mo ago but she never did start.     HS Troponin 2256.  D Dimer elevated.    She has no prior known CAD.     She is is ASH Cr 2.04     ECG SR 71 w OIWMI    She is on No Supplemental O2 in ER and sats are 96-98% no fever.      24 Tele SR 65 no alarms. On No O2 no fever. No cp no sob slept well.   History:      EKG:  Past Medical History:   Diagnosis Date    A-fib ([facility])     Chronic diastolic heart failure (HCC) 10/8/2015    CKD (chronic kidney disease), stage III ([facility]) 10/8/2015    Coronary artery disease involving native coronary artery of native heart without angina pectoris 10/8/2015    Essential hypertension 10/8/2015    Family history of coronary artery disease 10/8/2015    History of non-ST elevation myocardial infarction (NSTEMI) 10/8/2015    HTN (hypertension)     Hyperlipidemia 10/8/2015    PAD (peripheral artery disease) ([facility]) 10/8/2015     Past Surgical History:   Procedure Laterality Date    CARDIAC CATHETERIZATION  09/10/15    [facility] W [facility] DO    CARDIAC CATHETERIZATION      TOE AMPUTATION Right 2016       Family History  History reviewed. No pertinent family history.  [] Unable to obtain due to ventilated and/ or neurologic status    Social History  infarction) (HCC) [I21.4] 01/28/2024     Priority: Low           Impression/Plan:   NSTEMI - ASA BB Statin.  Will need Cath but will time appropriately due to ASH. Echo - will review.  Conitnue MIVF. Follow labs  ASH - daily labs.   Elevated D Dimer - CTA Chest - no PE  Gall stones noted on CT  HTN stable low salt  HPL - high intensity Statin.      Thank you for allowing us to participate in the care of this patient.     Will continue to follow.    Please call if questions or concerns arise.    Electronically signed by LIONEL ROMERO MD on 1/29/2024 at 5:06 AM

## 2024-01-30 ENCOUNTER — APPOINTMENT (OUTPATIENT)
Age: 74
DRG: 215 | End: 2024-01-30
Attending: INTERNAL MEDICINE
Payer: MEDICARE

## 2024-01-30 VITALS
TEMPERATURE: 96.9 F | BODY MASS INDEX: 27.3 KG/M2 | HEART RATE: 76 BPM | SYSTOLIC BLOOD PRESSURE: 124 MMHG | RESPIRATION RATE: 26 BRPM | HEIGHT: 63 IN | WEIGHT: 154.1 LBS | OXYGEN SATURATION: 100 % | DIASTOLIC BLOOD PRESSURE: 76 MMHG

## 2024-01-30 LAB
ALBUMIN SERPL-MCNC: 3.2 G/DL (ref 3.5–4.6)
ALP SERPL-CCNC: 47 U/L (ref 40–130)
ALT SERPL-CCNC: 39 U/L (ref 0–33)
ANION GAP SERPL CALCULATED.3IONS-SCNC: 13 MEQ/L (ref 9–15)
ANTI-XA UNFRAC HEPARIN: 1.7 IU/ML
APTT PPP: >150 SEC (ref 24.4–36.8)
AST SERPL-CCNC: 41 U/L (ref 0–35)
BASE EXCESS VENOUS: -6 (ref -3–3)
BILIRUB SERPL-MCNC: 0.5 MG/DL (ref 0.2–0.7)
BUN SERPL-MCNC: 50 MG/DL (ref 8–23)
CALCIUM IONIZED: 1.1 MMOL/L (ref 1.12–1.32)
CALCIUM SERPL-MCNC: 8.4 MG/DL (ref 8.5–9.9)
CHLORIDE SERPL-SCNC: 99 MEQ/L (ref 95–107)
CO2 SERPL-SCNC: 19 MEQ/L (ref 20–31)
CREAT SERPL-MCNC: 1.66 MG/DL (ref 0.5–0.9)
ECHO BSA: 1.76 M2
ECHO BSA: 1.77 M2
ECHO LA VOL A-L A2C: 48 ML (ref 22–52)
ECHO LA VOL A-L A4C: 79 ML (ref 22–52)
ECHO LA VOL MOD A2C: 46 ML (ref 22–52)
ECHO LA VOL MOD A4C: 74 ML (ref 22–52)
ECHO LA VOLUME AREA LENGTH: 64 ML
ECHO LA VOLUME INDEX A-L A2C: 28 ML/M2 (ref 16–34)
ECHO LA VOLUME INDEX A-L A4C: 45 ML/M2 (ref 16–34)
ECHO LA VOLUME INDEX AREA LENGTH: 37 ML/M2 (ref 16–34)
ECHO LA VOLUME INDEX MOD A2C: 26 ML/M2 (ref 16–34)
ECHO LA VOLUME INDEX MOD A4C: 43 ML/M2 (ref 16–34)
ECHO LV E' LATERAL VELOCITY: 5 CM/S
ECHO LV EDV A4C: 143 ML
ECHO LV EDV INDEX A4C: 82 ML/M2
ECHO LV EJECTION FRACTION A4C: 29 %
ECHO LV ESV A4C: 102 ML
ECHO LV ESV INDEX A4C: 59 ML/M2
ECHO LV FRACTIONAL SHORTENING: 0 % (ref 28–44)
ECHO LV INTERNAL DIMENSION DIASTOLE INDEX: 2.99 CM/M2
ECHO LV INTERNAL DIMENSION DIASTOLIC: 5.2 CM (ref 3.9–5.3)
ECHO LV INTERNAL DIMENSION SYSTOLIC INDEX: 2.99 CM/M2
ECHO LV INTERNAL DIMENSION SYSTOLIC: 5.2 CM
ECHO LV IVSD: 1 CM (ref 0.6–0.9)
ECHO LV IVSS: 1.3 CM
ECHO LV MASS 2D: 220.8 G (ref 67–162)
ECHO LV MASS INDEX 2D: 126.9 G/M2 (ref 43–95)
ECHO LV POSTERIOR WALL DIASTOLIC: 1.2 CM (ref 0.6–0.9)
ECHO LV POSTERIOR WALL SYSTOLIC: 1 CM
ECHO LV RELATIVE WALL THICKNESS RATIO: 0.46
ECHO MV A VELOCITY: 1.11 M/S
ECHO MV E DECELERATION TIME (DT): 111.9 MS
ECHO MV E VELOCITY: 1.06 M/S
ECHO MV E/A RATIO: 0.95
ECHO MV E/E' LATERAL: 21.2
ERYTHROCYTE [DISTWIDTH] IN BLOOD BY AUTOMATED COUNT: 13.1 % (ref 11.5–14.5)
GLOBULIN SER CALC-MCNC: 2.9 G/DL (ref 2.3–3.5)
GLUCOSE BLD-MCNC: 76 MG/DL (ref 70–99)
GLUCOSE BLD-MCNC: 86 MG/DL (ref 70–99)
GLUCOSE SERPL-MCNC: 81 MG/DL (ref 70–99)
HCO3 VENOUS: 19.5 MMOL/L (ref 23–29)
HCT VFR BLD AUTO: 29 % (ref 36–48)
HCT VFR BLD AUTO: 31.3 % (ref 37–47)
HGB BLD CALC-MCNC: 9.7 GM/DL (ref 12–16)
HGB BLD-MCNC: 10.2 G/DL (ref 12–16)
INR PPP: 1.3
LACTATE BLDV-SCNC: 0.5 MMOL/L (ref 0.5–2.2)
LACTATE: 1.39 MMOL/L (ref 0.4–2)
LDH SERPL-CCNC: 390 U/L (ref 135–214)
MCH RBC QN AUTO: 28.6 PG (ref 27–31.3)
MCHC RBC AUTO-ENTMCNC: 32.6 % (ref 33–37)
MCV RBC AUTO: 87.7 FL (ref 79.4–94.8)
O2 SAT, VEN: 89 %
PCO2, VEN: 35.5 MM HG (ref 40–50)
PERFORMED ON: ABNORMAL
PERFORMED ON: NORMAL
PH VENOUS: 7.35 (ref 7.32–7.42)
PLATELET # BLD AUTO: 184 K/UL (ref 130–400)
PO2, VEN: 58 MM HG
POC CHLORIDE: 105 MEQ/L (ref 99–110)
POC CREATININE: 1.6 MG/DL (ref 0.6–1.2)
POC SAMPLE TYPE: ABNORMAL
POTASSIUM SERPL-SCNC: 4.5 MEQ/L (ref 3.5–5.1)
POTASSIUM SERPL-SCNC: 4.8 MEQ/L (ref 3.4–4.9)
PROT SERPL-MCNC: 6.1 G/DL (ref 6.3–8)
PROTHROMBIN TIME: 16.8 SEC (ref 12.3–14.9)
RBC # BLD AUTO: 3.57 M/UL (ref 4.2–5.4)
SODIUM BLD-SCNC: 131 MEQ/L (ref 136–145)
SODIUM SERPL-SCNC: 131 MEQ/L (ref 135–144)
TCO2 CALC VENOUS: 21 MMOL/L
WBC # BLD AUTO: 10.5 K/UL (ref 4.8–10.8)

## 2024-01-30 PROCEDURE — 82565 ASSAY OF CREATININE: CPT

## 2024-01-30 PROCEDURE — 36600 WITHDRAWAL OF ARTERIAL BLOOD: CPT

## 2024-01-30 PROCEDURE — 85027 COMPLETE CBC AUTOMATED: CPT

## 2024-01-30 PROCEDURE — 6360000004 HC RX CONTRAST MEDICATION: Performed by: INTERNAL MEDICINE

## 2024-01-30 PROCEDURE — 99152 MOD SED SAME PHYS/QHP 5/>YRS: CPT | Performed by: INTERNAL MEDICINE

## 2024-01-30 PROCEDURE — 33990 INSJ PERQ VAD L HRT ARTERIAL: CPT | Performed by: INTERNAL MEDICINE

## 2024-01-30 PROCEDURE — C1889 IMPLANT/INSERT DEVICE, NOC: HCPCS | Performed by: INTERNAL MEDICINE

## 2024-01-30 PROCEDURE — 6360000002 HC RX W HCPCS: Performed by: INTERNAL MEDICINE

## 2024-01-30 PROCEDURE — 93325 DOPPLER ECHO COLOR FLOW MAPG: CPT | Performed by: INTERNAL MEDICINE

## 2024-01-30 PROCEDURE — 85014 HEMATOCRIT: CPT

## 2024-01-30 PROCEDURE — 83605 ASSAY OF LACTIC ACID: CPT

## 2024-01-30 PROCEDURE — 5A0221D ASSISTANCE WITH CARDIAC OUTPUT USING IMPELLER PUMP, CONTINUOUS: ICD-10-PCS | Performed by: INTERNAL MEDICINE

## 2024-01-30 PROCEDURE — 2709999900 HC NON-CHARGEABLE SUPPLY: Performed by: INTERNAL MEDICINE

## 2024-01-30 PROCEDURE — 2500000003 HC RX 250 WO HCPCS: Performed by: INTERNAL MEDICINE

## 2024-01-30 PROCEDURE — 99223 1ST HOSP IP/OBS HIGH 75: CPT | Performed by: INTERNAL MEDICINE

## 2024-01-30 PROCEDURE — 6370000000 HC RX 637 (ALT 250 FOR IP): Performed by: INTERNAL MEDICINE

## 2024-01-30 PROCEDURE — C1894 INTRO/SHEATH, NON-LASER: HCPCS | Performed by: INTERNAL MEDICINE

## 2024-01-30 PROCEDURE — 84295 ASSAY OF SERUM SODIUM: CPT

## 2024-01-30 PROCEDURE — 85730 THROMBOPLASTIN TIME PARTIAL: CPT

## 2024-01-30 PROCEDURE — 85520 HEPARIN ASSAY: CPT

## 2024-01-30 PROCEDURE — 2580000003 HC RX 258: Performed by: INTERNAL MEDICINE

## 2024-01-30 PROCEDURE — C1769 GUIDE WIRE: HCPCS | Performed by: INTERNAL MEDICINE

## 2024-01-30 PROCEDURE — 83615 LACTATE (LD) (LDH) ENZYME: CPT

## 2024-01-30 PROCEDURE — 82803 BLOOD GASES ANY COMBINATION: CPT

## 2024-01-30 PROCEDURE — B2111ZZ FLUOROSCOPY OF MULTIPLE CORONARY ARTERIES USING LOW OSMOLAR CONTRAST: ICD-10-PCS | Performed by: INTERNAL MEDICINE

## 2024-01-30 PROCEDURE — 93005 ELECTROCARDIOGRAM TRACING: CPT | Performed by: INTERNAL MEDICINE

## 2024-01-30 PROCEDURE — 93458 L HRT ARTERY/VENTRICLE ANGIO: CPT | Performed by: INTERNAL MEDICINE

## 2024-01-30 PROCEDURE — 99153 MOD SED SAME PHYS/QHP EA: CPT | Performed by: INTERNAL MEDICINE

## 2024-01-30 PROCEDURE — 4A023N7 MEASUREMENT OF CARDIAC SAMPLING AND PRESSURE, LEFT HEART, PERCUTANEOUS APPROACH: ICD-10-PCS | Performed by: INTERNAL MEDICINE

## 2024-01-30 PROCEDURE — 36620 INSERTION CATHETER ARTERY: CPT | Performed by: INTERNAL MEDICINE

## 2024-01-30 PROCEDURE — 36415 COLL VENOUS BLD VENIPUNCTURE: CPT

## 2024-01-30 PROCEDURE — 93308 TTE F-UP OR LMTD: CPT

## 2024-01-30 PROCEDURE — 99233 SBSQ HOSP IP/OBS HIGH 50: CPT | Performed by: INTERNAL MEDICINE

## 2024-01-30 PROCEDURE — 85347 COAGULATION TIME ACTIVATED: CPT

## 2024-01-30 PROCEDURE — 02HA3RZ INSERTION OF SHORT-TERM EXTERNAL HEART ASSIST SYSTEM INTO HEART, PERCUTANEOUS APPROACH: ICD-10-PCS | Performed by: INTERNAL MEDICINE

## 2024-01-30 PROCEDURE — 93308 TTE F-UP OR LMTD: CPT | Performed by: INTERNAL MEDICINE

## 2024-01-30 PROCEDURE — 85610 PROTHROMBIN TIME: CPT

## 2024-01-30 PROCEDURE — 84132 ASSAY OF SERUM POTASSIUM: CPT

## 2024-01-30 PROCEDURE — 93321 DOPPLER ECHO F-UP/LMTD STD: CPT | Performed by: INTERNAL MEDICINE

## 2024-01-30 PROCEDURE — 82435 ASSAY OF BLOOD CHLORIDE: CPT

## 2024-01-30 PROCEDURE — 82330 ASSAY OF CALCIUM: CPT

## 2024-01-30 PROCEDURE — B2151ZZ FLUOROSCOPY OF LEFT HEART USING LOW OSMOLAR CONTRAST: ICD-10-PCS | Performed by: INTERNAL MEDICINE

## 2024-01-30 PROCEDURE — 33999 UNLISTED PX CARDIAC SURGERY: CPT | Performed by: INTERNAL MEDICINE

## 2024-01-30 PROCEDURE — 80053 COMPREHEN METABOLIC PANEL: CPT

## 2024-01-30 DEVICE — KIT CATHETER PUMP INSERTION INTRACARDIAC IMPELLA CP: Type: IMPLANTABLE DEVICE | Status: FUNCTIONAL

## 2024-01-30 RX ORDER — HEPARIN SODIUM 10000 [USP'U]/100ML
5-30 INJECTION, SOLUTION INTRAVENOUS CONTINUOUS
Status: DISCONTINUED | OUTPATIENT
Start: 2024-01-30 | End: 2024-01-30

## 2024-01-30 RX ORDER — SODIUM CHLORIDE 9 MG/ML
INJECTION, SOLUTION INTRAVENOUS
Status: DISCONTINUED
Start: 2024-01-30 | End: 2024-01-30 | Stop reason: HOSPADM

## 2024-01-30 RX ORDER — SODIUM CHLORIDE 9 MG/ML
INJECTION, SOLUTION INTRAVENOUS CONTINUOUS
Status: DISCONTINUED | OUTPATIENT
Start: 2024-01-30 | End: 2024-01-30

## 2024-01-30 RX ORDER — ACETAMINOPHEN 325 MG/1
650 TABLET ORAL EVERY 4 HOURS PRN
Status: DISCONTINUED | OUTPATIENT
Start: 2024-01-30 | End: 2024-01-30 | Stop reason: HOSPADM

## 2024-01-30 RX ORDER — HEPARIN SODIUM 1000 [USP'U]/ML
60 INJECTION, SOLUTION INTRAVENOUS; SUBCUTANEOUS PRN
Status: DISCONTINUED | OUTPATIENT
Start: 2024-01-30 | End: 2024-01-30 | Stop reason: HOSPADM

## 2024-01-30 RX ORDER — HEPARIN SODIUM 1000 [USP'U]/ML
30 INJECTION, SOLUTION INTRAVENOUS; SUBCUTANEOUS PRN
Status: DISCONTINUED | OUTPATIENT
Start: 2024-01-30 | End: 2024-01-30

## 2024-01-30 RX ORDER — MIDAZOLAM HYDROCHLORIDE 2 MG/2ML
2 INJECTION, SOLUTION INTRAMUSCULAR; INTRAVENOUS
Status: DISCONTINUED | OUTPATIENT
Start: 2024-01-30 | End: 2024-01-30 | Stop reason: HOSPADM

## 2024-01-30 RX ORDER — SODIUM CHLORIDE 450 MG/100ML
75 INJECTION, SOLUTION INTRAVENOUS CONTINUOUS
Status: DISCONTINUED | OUTPATIENT
Start: 2024-01-30 | End: 2024-01-30

## 2024-01-30 RX ORDER — SODIUM CHLORIDE 0.9 % (FLUSH) 0.9 %
5-40 SYRINGE (ML) INJECTION EVERY 12 HOURS SCHEDULED
Status: DISCONTINUED | OUTPATIENT
Start: 2024-01-30 | End: 2024-01-30

## 2024-01-30 RX ORDER — ONDANSETRON 2 MG/ML
4 INJECTION INTRAMUSCULAR; INTRAVENOUS EVERY 6 HOURS PRN
Status: DISCONTINUED | OUTPATIENT
Start: 2024-01-30 | End: 2024-01-30 | Stop reason: HOSPADM

## 2024-01-30 RX ORDER — HEPARIN SODIUM 1000 [USP'U]/ML
INJECTION, SOLUTION INTRAVENOUS; SUBCUTANEOUS PRN
Status: DISCONTINUED | OUTPATIENT
Start: 2024-01-30 | End: 2024-01-30 | Stop reason: HOSPADM

## 2024-01-30 RX ORDER — NITROGLYCERIN 0.4 MG/1
0.4 TABLET SUBLINGUAL EVERY 5 MIN PRN
Status: DISCONTINUED | OUTPATIENT
Start: 2024-01-30 | End: 2024-01-30 | Stop reason: HOSPADM

## 2024-01-30 RX ORDER — FENTANYL CITRATE 0.05 MG/ML
25 INJECTION, SOLUTION INTRAMUSCULAR; INTRAVENOUS
Status: DISCONTINUED | OUTPATIENT
Start: 2024-01-30 | End: 2024-01-30 | Stop reason: HOSPADM

## 2024-01-30 RX ORDER — MORPHINE SULFATE 2 MG/ML
2 INJECTION, SOLUTION INTRAMUSCULAR; INTRAVENOUS
Status: DISCONTINUED | OUTPATIENT
Start: 2024-01-30 | End: 2024-01-30 | Stop reason: HOSPADM

## 2024-01-30 RX ORDER — NITROGLYCERIN 20 MG/100ML
INJECTION INTRAVENOUS CONTINUOUS PRN
Status: COMPLETED | OUTPATIENT
Start: 2024-01-30 | End: 2024-01-30

## 2024-01-30 RX ORDER — HEPARIN SODIUM 10000 [USP'U]/100ML
5-30 INJECTION, SOLUTION INTRAVENOUS CONTINUOUS
Status: DISCONTINUED | OUTPATIENT
Start: 2024-01-30 | End: 2024-01-30 | Stop reason: HOSPADM

## 2024-01-30 RX ORDER — LABETALOL HYDROCHLORIDE 5 MG/ML
10 INJECTION, SOLUTION INTRAVENOUS EVERY 30 MIN PRN
Status: DISCONTINUED | OUTPATIENT
Start: 2024-01-30 | End: 2024-01-30 | Stop reason: HOSPADM

## 2024-01-30 RX ORDER — HYDRALAZINE HYDROCHLORIDE 20 MG/ML
10 INJECTION INTRAMUSCULAR; INTRAVENOUS EVERY 10 MIN PRN
Status: DISCONTINUED | OUTPATIENT
Start: 2024-01-30 | End: 2024-01-30 | Stop reason: HOSPADM

## 2024-01-30 RX ORDER — SODIUM CHLORIDE 9 MG/ML
INJECTION, SOLUTION INTRAVENOUS PRN
Status: DISCONTINUED | OUTPATIENT
Start: 2024-01-30 | End: 2024-01-30

## 2024-01-30 RX ORDER — LIDOCAINE HYDROCHLORIDE 10 MG/ML
INJECTION, SOLUTION INFILTRATION; PERINEURAL PRN
Status: DISCONTINUED | OUTPATIENT
Start: 2024-01-30 | End: 2024-01-30 | Stop reason: HOSPADM

## 2024-01-30 RX ORDER — DIPHENHYDRAMINE HYDROCHLORIDE 50 MG/ML
50 INJECTION INTRAMUSCULAR; INTRAVENOUS ONCE
Status: COMPLETED | OUTPATIENT
Start: 2024-01-30 | End: 2024-01-30

## 2024-01-30 RX ORDER — SODIUM CHLORIDE 0.9 % (FLUSH) 0.9 %
5-40 SYRINGE (ML) INJECTION PRN
Status: DISCONTINUED | OUTPATIENT
Start: 2024-01-30 | End: 2024-01-30

## 2024-01-30 RX ORDER — HEPARIN SODIUM 1000 [USP'U]/ML
60 INJECTION, SOLUTION INTRAVENOUS; SUBCUTANEOUS PRN
Status: DISCONTINUED | OUTPATIENT
Start: 2024-01-30 | End: 2024-01-30

## 2024-01-30 RX ORDER — MIDAZOLAM HYDROCHLORIDE 1 MG/ML
INJECTION INTRAMUSCULAR; INTRAVENOUS PRN
Status: DISCONTINUED | OUTPATIENT
Start: 2024-01-30 | End: 2024-01-30 | Stop reason: HOSPADM

## 2024-01-30 RX ORDER — HEPARIN SODIUM 1000 [USP'U]/ML
30 INJECTION, SOLUTION INTRAVENOUS; SUBCUTANEOUS PRN
Status: DISCONTINUED | OUTPATIENT
Start: 2024-01-30 | End: 2024-01-30 | Stop reason: HOSPADM

## 2024-01-30 RX ORDER — HEPARIN SODIUM 1000 [USP'U]/ML
60 INJECTION, SOLUTION INTRAVENOUS; SUBCUTANEOUS ONCE
Status: DISCONTINUED | OUTPATIENT
Start: 2024-01-30 | End: 2024-01-30

## 2024-01-30 RX ADMIN — ASPIRIN 81 MG: 81 TABLET, COATED ORAL at 08:06

## 2024-01-30 RX ADMIN — METOPROLOL SUCCINATE 25 MG: 25 TABLET, EXTENDED RELEASE ORAL at 08:06

## 2024-01-30 RX ADMIN — HEPARIN SODIUM AND DEXTROSE 12 UNITS/KG/HR: 10000; 5 INJECTION INTRAVENOUS at 13:13

## 2024-01-30 RX ADMIN — SODIUM CHLORIDE, PRESERVATIVE FREE 10 ML: 5 INJECTION INTRAVENOUS at 08:07

## 2024-01-30 RX ADMIN — SODIUM CHLORIDE 75 ML/HR: 4.5 INJECTION, SOLUTION INTRAVENOUS at 09:10

## 2024-01-30 RX ADMIN — DIPHENHYDRAMINE HYDROCHLORIDE 50 MG: 50 INJECTION INTRAMUSCULAR; INTRAVENOUS at 08:40

## 2024-01-30 RX ADMIN — HYDROCORTISONE SODIUM SUCCINATE 200 MG: 100 INJECTION, POWDER, FOR SOLUTION INTRAMUSCULAR; INTRAVENOUS at 08:39

## 2024-01-30 RX ADMIN — LEVOTHYROXINE SODIUM 175 MCG: 0.15 TABLET ORAL at 08:06

## 2024-01-30 RX ADMIN — SODIUM BICARBONATE: 84 INJECTION, SOLUTION INTRAVENOUS at 13:25

## 2024-01-30 ASSESSMENT — ENCOUNTER SYMPTOMS
WHEEZING: 0
COUGH: 0
BLOOD IN STOOL: 0
SHORTNESS OF BREATH: 0
STRIDOR: 0
NAUSEA: 0
GASTROINTESTINAL NEGATIVE: 1
RESPIRATORY NEGATIVE: 1
CHEST TIGHTNESS: 0
EYES NEGATIVE: 1

## 2024-01-30 NOTE — CONSULTS
Pulmonary and Critical Care Medicine  Consult Note  Encounter Date: 2024 11:41 AM    Ms. Aurelia Thornton is a 73 y.o. female  : 1950  Requesting Provider: Christoph Guevara MD    Reason for request: ICU management            HISTORY OF PRESENT ILLNESS:    Patient is 73 y.o. presents with Non-ST elevation MI, she underwent left heart catheterization today, she was found to have 100% occlusion of proximal % circumflex ostial occlusion, 100% RCA and EF of 10%.  Patient has Impella  placed, currently awaiting transfer to tertiary care center.  She denies chest pain, no shortness of breath, no hematoma at the Impella device insertion site, no limb cyanosis          Past Medical History:        Diagnosis Date    A-fib ([facility])     Chronic diastolic heart failure ([facility]) 10/8/2015    CKD (chronic kidney disease), stage III ([facility]) 10/8/2015    Coronary artery disease involving native coronary artery of native heart without angina pectoris 10/8/2015    Essential hypertension 10/8/2015    Family history of coronary artery disease 10/8/2015    History of non-ST elevation myocardial infarction (NSTEMI) 10/8/2015    HTN (hypertension)     Hyperlipidemia 10/8/2015    PAD (peripheral artery disease) ([facility]) 10/8/2015       Past Surgical History:        Procedure Laterality Date    CARDIAC CATHETERIZATION  09/10/15    [facility] DO    CARDIAC CATHETERIZATION      TOE AMPUTATION Right 2016       Social History:     reports that she has never smoked. She does not have any smokeless tobacco history on file. She reports that she does not drink alcohol and does not use drugs.    Family History:   History reviewed. No pertinent family history.    Allergies:  Latex, Iodinated contrast media, Pravastatin, and Vytorin [ezetimibe-simvastatin]        MEDICATIONS during current hospitalization:    Continuous Infusions:   sodium bicarbonate 12.5 mEq in dextrose 5 % 500 mL infusion (FOR IMPELLA PURGE)      heparin (PORCINE)

## 2024-01-30 NOTE — DISCHARGE SUMMARY
right chamber dilatation. There is prominent triple-vessel coronary calcification. Lungs/pleura: There are multiple linear and patchy areas of scarring scattered throughout the lungs bilaterally. The previously seen cysts described 3 mm nodule in the anterior right apex in the body of the report is not evident.  The 3 mm nodule described in the left upper lobe in the impression is not evident. Small calcified granulomata are seen in the left upper and lower lobes. There is no sign of any active inflammatory process. Soft Tissues/Bones: There is no sign of any abnormality of the superficial soft tissue structures. The osseous structures are normal in appearance for the patient's age. Abdomen/Pelvis: Organs: The liver is normal in appearance without mass and without evidence of intrahepatic biliary ductal dilatation. The spleen and pancreas are unremarkable. The adrenal glands are unremarkable. There is mild cholelithiasis, with a single calcified dependent in the gallbladder.  There is moderate gallbladder wall thickening is suggesting acute cholecystitis.  There is no pericholecystic fluid.  Suggest correlation with ultrasound of the right upper quadrant, if acute cholecystitis is of clinical concern. The kidneys are normal in appearance with no sign of calculus, mass, or cyst. There is no sign of hydronephrosis or hydroureter. GI/Bowel: There is no evidence of bowel obstruction. No evidence of abnormal bowel wall thickening or distension. The appendix is visualized and is unremarkable.  No evidence of acute appendicitis. Pelvis: Large densely calcified uterine fibroids are seen arising from the fundus bilaterally in exophytic fibroid on the right and an intramural fibroid on the left. The uterus is not enlarged. The adnexal regions are normal in appearance. There is no sign of pelvic or inguinal mass or adenopathy. Peritoneum/Retroperitoneum: No evidence of ascites or free air. No evidence of lymphadenopathy. Aorta  is normal in caliber. Bones/Soft Tissues: There is no sign of any abnormality of the superficial soft tissue structures. There is normal appearance of the osseous structures for the patient's age.     1. Cholelithiasis with gallbladder wall thickening suggesting acute cholecystitis. Suggest correlation with ultrasound of the right upper quadrant, if acute cholecystitis is of clinical concern. 2. Mild mediastinal lymphadenopathy, slightly increased compared to the previous report of 2017. 3. Cardiomegaly with triple-vessel coronary artery calcification. 4. No acute cardiopulmonary process. 5. Large calcified uterine fibroids. RECOMMENDATIONS: Suggest ultrasound examination of the right upper quadrant to evaluate for possible acute cholecystitis.     XR ABDOMEN (2 VIEWS)    Result Date: 1/28/2024  EXAMINATION: TWO XRAY VIEWS OF THE ABDOMEN 1/28/2024 12:39 pm COMPARISON: The previous study performed 10/03/2005. HISTORY: ORDERING SYSTEM PROVIDED HISTORY: constipation TECHNOLOGIST PROVIDED HISTORY: Reason for exam:->constipation What reading provider will be dictating this exam?->CRC FINDINGS: The bowel gas pattern is unremarkable.  There is no evidence of obstruction or gross free air.  No abnormal soft tissue density is seen.  There has been an interval increase in calcifications within the central pelvis, most likely representing increasing calcified uterine leiomyomata.  Osteo-degenerative changes are noted involving the visualized thoracolumbar spine.     1. Nonobstructive bowel gas pattern. 2. Interval increase in calcifications within the central pelvis, most likely representing increasing calcified uterine leiomyomata.  This is when compared to the previous study performed 10/03/2005.     XR CHEST (2 VW)    Result Date: 1/28/2024  EXAMINATION: TWO XRAY VIEWS OF THE CHEST 1/28/2024 12:39 pm COMPARISON: None. HISTORY: ORDERING SYSTEM PROVIDED HISTORY: chest pain TECHNOLOGIST PROVIDED HISTORY: Reason for

## 2024-01-30 NOTE — PROGRESS NOTES
Received report from Beka PAREKH 1WT. Pt arrives to pre/post cath. Alert and oriented. Consent signed. Additional IV access obtained. Prepped and ready for procedure.  Electronically signed by Nancy Hernandez RN on 1/30/2024 at 9:14 AM

## 2024-01-30 NOTE — BRIEF OP NOTE
Section of Cardiology  Adult Brief Cardiac Cath Procedure Note        Procedure(s):    LHC, b/l coronary angio, LV gram  Impella CP insertion    Pre-operative Diagnosis:    NSTEMI    H&P Status: Completed and reviewed.     Post-operative Diagnosis:    Severe triple-vessel disease  Cardiogenic shock    Findings:  See full report  Left main: Moderate diffuse disease  LAD: Proximal 100% occlusion  Circumflex: 100% ostial occlusion  RCA: 100% ostial occlusion  LVEDP 20 mmHg  No aortic valve gradient on catheter pullback  EF 10% by LV gram    Given elevated LVEDP with severely reduced EF and during the procedure blood pressure trending down  A decision was made to place an Impella to the right groin    Complications:  none    Recommendations:  Plan to transfer patient to [facility] for possible revascularization  Continue aspirin and high intensity statin indefinitely for secondary prevention of CAD  Continue beta-blocker       Primary Proceduralist:   Miki Tate MD    Full procedure note to follow

## 2024-01-30 NOTE — CARE COORDINATION
PT FOR CATH TODAY D/T LOW EF.  NO SX PER SURGEON.  DC PLAN REMAINS HOME ONCE CLEARED BY DRS.     1130  PLAN FOR TX TO [facility] MAIN ONCE BED AVAILABLE

## 2024-01-30 NOTE — PROGRESS NOTES
Intensive Care Registered Nurse Shift Summary  24   Patient Name: Aurelia Thornton  Attending Provider: Christoph Guevara MD      Admit Date: 2024  MRN: [id number]                           : 1950  Full Code    Code Status: Full Code    Am nursing  assessment completed.    Pt :               Alert and oriented.      Diet:NPO  RESP:               Lung sounds:   diminished       Oxygen:2L/nc  VENT:N/A    Complaints of:                         Pain: Denies  IV:   #20 L AC and # 22 R wrist           patent/ flushed/ capped, no signs of infiltration noted, dressing clean/dry/intact.  Lines:L Radial Mary Ellen. R Radial band  TELE:     SR            Dressings:R GROIN IMPALA                    Precautions:      Bleeding         Falls:        Joel:   Chart and meds reviewed.           Noted Labs:   Plan for today: Transfer to F    Bed wheels locked and in lowest position. Call light and bedside table within reach.   NOTES: 11:09am Received from Cath Lab via bed to CCU 1. Alert and oriented. VSS MP-Sr. Impala to R groin. Dressing dry and intact. [facility].  Lock tight.  Denies chest pain pressure tightness.  Old blood noted @ site. Fili Rep@ bedside.  11:15am Hematoma noted @ [facility] site. Pressure and sandbag applied. Dr Singleton called and notified.   11:30am Dr Singleton in room with cath lab staff and applied Fem stop to [facility] site per Dr singleton. R radial band dry intact.  12:30pm Itzel Np in to place Arterial line in L radial.Good waveform.  12;38pm [facility] team called and updated on status. Will call with transport time  13:30pm Sister Stanton called and updated on status.  13:40pm DR Guevara in and updated on status.  1403pm Report called to Kymberly @ [facility]. Patient to transfer this pm. Sister called and notified of time of transfer and room number  14:30pm Radial band off. Site without redness or swelling. Transparent dressing on  14:49pm [facility] Transport team here to transport

## 2024-01-30 NOTE — PROCEDURES
PROCEDURE:   Radial artery line placement. (A-line)  54787    INDICATION:   Cardiac monitoring    CONSENT: The patient was counseled regarding the procedure, it's indications, risks, potential complications and alternatives and any questions were answered. Consent was obtained.nt.      PROCEDURE SUMMARY:   Radial arteries were assessed by palpation and ultrasound localization.  Nato test was done to asses collateral circulation.  The patient was prepped and draped in the usual sterile manner using chlorhexidine scrub. 1% lidocaine was used to numb the region. The left  radial artery was palpated and successfully cannulated on the first pass. Pulsatile, arterial blood was visualized and the artery was then threaded using the Seldinger technique and a catheter was then sutured into place. Good wave-form was obtained. The patient tolerated the procedure well without any immediate complications. The area was cleaned and Tegaderm was applied.     ESTIMATED BLOOD LOSS:   Minimal    COMPLICATIONS:  No immediate complication     Clemencia Lyman MD

## 2024-01-30 NOTE — FLOWSHEET NOTE
Pt Aox4, assessment completed. Pt denies chest pain, SOB, N, V at this time. Pt states she feels constipated and has had two very small bowel movements. Pt took PRN glycolax on previous shift. Pt ambulatory and steady. Equal and unlabored respirations noted.

## 2024-01-30 NOTE — PROGRESS NOTES
PROGRESS NOTE      Patient Name: Aurelia Thornton  Admit Date: 2024 11:39 AM  MR #: [id number]  : 1950    Attending Physician: Christoph Guevara MD  Reason for consult: NSTEMI    History of Presenting Illness:      Aurelia Thornton is a 73 y.o. female on hospital day 2 with a history of .   History Obtained From:  patient, electronic medical record    Presents to ER for generalized not feeling well since this past Thursday.  It appears that she told the ER provider that she had Chest discomfort but denies having CP nor SOB.  She felt weak and just not her self.  She was taken off Lisinopril 3 months ago but then she started to take it again on Thursday because she though it would help her.  She was suppose to take Valsartan instead 3 mo ago but she never did start.     HS Troponin 2256.  D Dimer elevated.    She has no prior known CAD.     She is is ASH Cr 2.04     ECG SR 71 w OIWMI    She is on No Supplemental O2 in ER and sats are 96-98% no fever.      24 Tele SR 65 no alarms. On No O2 no fever. No cp no sob slept well.     24 Tele SR 75 No events overnight vitals stable no CP nor SOB she feels better w no further abd discomfort  History:      EKG:  Past Medical History:   Diagnosis Date    A-fib (HCC)     Chronic diastolic heart failure (HCC) 10/8/2015    CKD (chronic kidney disease), stage III ([facility]) 10/8/2015    Coronary artery disease involving native coronary artery of native heart without angina pectoris 10/8/2015    Essential hypertension 10/8/2015    Family history of coronary artery disease 10/8/2015    History of non-ST elevation myocardial infarction (NSTEMI) 10/8/2015    HTN (hypertension)     Hyperlipidemia 10/8/2015    PAD (peripheral artery disease) ([facility]) 10/8/2015     Past Surgical History:   Procedure Laterality Date    CARDIAC CATHETERIZATION  09/10/15    [facility] W [facility] DO    CARDIAC CATHETERIZATION      TOE AMPUTATION Right 2016       Family History  History reviewed. No

## 2024-01-31 LAB
EKG ATRIAL RATE: 75 BPM
EKG P AXIS: 74 DEGREES
EKG P-R INTERVAL: 186 MS
EKG Q-T INTERVAL: 396 MS
EKG QRS DURATION: 122 MS
EKG QTC CALCULATION (BAZETT): 442 MS
EKG R AXIS: -46 DEGREES
EKG T AXIS: 111 DEGREES
EKG VENTRICULAR RATE: 75 BPM
POC ACT LR: 189 SEC
POC ACT LR: 325 SEC
POC ACT LR: 347 SEC

## 2024-04-14 PROBLEM — I50.9 HEART FAILURE, UNSPECIFIED (HCC): Status: ACTIVE | Noted: 2024-01-01

## 2024-04-14 NOTE — CONSULTS
[facility]  Consult Note           Reason for Consult:  fluid overload  Requesting Physician:  Dr. Sr    Chief Complaint:  shortness of breath  History Obtained From:  patient, electronic medical record    History of Present Ilness:    73 y.o. year old female admitted with fluid overload.  Patient has new ASH.  Patient has risk factors of diabetes mellitus hypertension coronary artery disease with congestive heart failure with an EF of 25 to 30% and then down to 10%.  Also with peripheral vascular disease with history of toe amputation.  Patient presented with non-ST elevation MI at the end of January and was transferred to [facility] with an Impella device.  Had ATN that was multifactorial and started CRRT.  Was switched over to intermittent hemodialysis.  Was discharged to [facility].  Set up at [facility].  Patient is unsure who her nephrologist is over at [facility] and I am unable to access those records.  Her last dialysis was Friday.  She has had significant amount of swelling for some time.  Imaging shows right-sided consolidation with large left-sided right effusion as well as anasarca.  She does not make much urine.    Past Medical History:        Diagnosis Date    A-fib (HCC)     Chronic diastolic heart failure (HCC) 10/8/2015    CKD (chronic kidney disease), stage III ([facility]) 10/8/2015    Coronary artery disease involving native coronary artery of native heart without angina pectoris 10/8/2015    Essential hypertension 10/8/2015    Family history of coronary artery disease 10/8/2015    History of non-ST elevation myocardial infarction (NSTEMI) 10/8/2015    HTN (hypertension)     Hyperlipidemia 10/8/2015    PAD (peripheral artery disease) ([facility]) 10/8/2015       Past Surgical History:        Procedure Laterality Date    CARDIAC CATHETERIZATION  09/10/15    [facility] W IRVING DO    CARDIAC CATHETERIZATION      CARDIAC PROCEDURE N/A 1/30/2024    Left heart cath / coronary angiography  the consultation.  Please do not hesitate to call with questions.    Dharmesh Hinojosa MD

## 2024-04-14 NOTE — PROGRESS NOTES
Discussed patient with Dr Baum, will IV heparin infusion given the LV thrombus noted at at [facility] two months ago. This can be held for a couple of hours for thoracentesis tomorrow if the plan remains to obtain one.

## 2024-04-14 NOTE — ED PROVIDER NOTES
Patient signed out to me at 7AM by Dr. Espana. Initial evaluation and presentation as documented in their full ED note.   Aurelia Thornton is a 73 y.o. female with a PMH clinically significant for HTN, HLD, DM II, ESRD on HD TTS, CAD, and HFrEF with EF of 10-15% initially presenting to the ED c/o abdominal pain and found to have worsening pulmonary infiltrates concerning for PNA vs Pulmonary edema. Initiated on abx. Otherwise stable at this time.  Pending: Labs  Plan: F/u labs and admit to IM under Dr. Sr who is already aware of the patient in the ED.    ED Course as of 04/14/24 0941   Sun Apr 14, 2024   0941 Creatinine(!): 3.93 [NA]   0941 BUN,BUNPL(!): 35 [NA]   0941 Pro-BNP: >70,000 [NA]   0941 Procalcitonin(!): 0.25 [NA]      ED Course User Index  [NA] Anthony Kelsey MD       Labs and Imaging visualized and interpreted by myself and as noted above.  Given findings, clinical presentation thought most likely consistent w/ acute decompensated heart failure versus pneumonia in the setting of known severe heart failure.  TDC in place and patient with HD noted in chart review. Patient will be admitted to IM at this time. Discussed with Dr. Sr, who requested trops added on. Will do so. No indications for lasix currently given patient is ESRD on HD.   Pt was administered   Medications   piperacillin-tazobactam (ZOSYN) 3,375 mg in sodium chloride 0.9 % 50 mL IVPB (mini-bag) (has no administration in time range)   vancomycin (VANCOCIN) 1,250 mg in sodium chloride 0.9 % 250 mL IVPB (ADDAVIAL) (has no administration in time range)   vancomycin (VANCOCIN) intermittent dosing (placeholder) (has no administration in time range)   morphine sulfate (PF) injection 4 mg (4 mg IntraVENous Given 4/14/24 0544)   ondansetron (ZOFRAN) injection 4 mg (4 mg IntraVENous Given 4/14/24 0544)   cefTRIAXone (ROCEPHIN) 1,000 mg in sodium chloride 0.9 % 50 mL IVPB (mini-bag) (0 mg IntraVENous Stopped 4/14/24 0748)   doxycycline

## 2024-04-14 NOTE — ED NOTES
Care assumed at this time, pt resting with eyes closed, no resp distress noted, resp even and unlabored. Vitals obtained, pt denies any needs, call light given.    error

## 2024-04-14 NOTE — ED TRIAGE NOTES
Pt presents to ED via EMS with co upper abdominal pain. Pt states she has had this pain for a while and was here in January in which they found a gall stone. Pt states the same pain but it worsened today. Pt took tylenol PTA.

## 2024-04-14 NOTE — ED NOTES
Transport at bedside, pt alert and oriented, no resp distress noted, resp even and unlabored. Pt belongings with pt.

## 2024-04-14 NOTE — FLOWSHEET NOTE
Patient admitted to unit. Seen by cardiology, orders for Heparin drip. Drip started. Patient discussed POC. Pt resting quietly at this time. Pt voices no complaint

## 2024-04-14 NOTE — CARE COORDINATION
Case Management Assessment  Initial Evaluation    Date/Time of Evaluation: 4/14/2024 10:59 AM  Assessment Completed by: ANKUR Perera    If patient is discharged prior to next notation, then this note serves as note for discharge by case management.    Patient Name: Aurelia Thornton                   YOB: 1950  Diagnosis: Heart failure, unspecified (HCC) [I50.9]  Diverticulosis [K57.90]  Pleural effusion [J90]  Other ascites [R18.8]  Calculus of gallbladder without cholecystitis without obstruction [K80.20]  Pneumonia due to infectious organism, unspecified laterality, unspecified part of lung [J18.9]  Chronic congestive heart failure, unspecified heart failure type (HCC) [I50.9]                   Date / Time: 4/14/2024  5:01 AM    Patient Admission Status: Inpatient   Readmission Risk (Low < 19, Mod (19-27), High > 27): Readmission Risk Score: 16.7    Current PCP: Brittnee Carrizales MD  PCP verified by CM? Yes    Chart Reviewed: Yes      History Provided by: Patient  Patient Orientation: Alert and Oriented    Patient Cognition: Alert    Hospitalization in the last 30 days (Readmission):  No    If yes, Readmission Assessment in CM Navigator will be completed.    Advance Directives:      Code Status: Full Code   Patient's Primary Decision Maker is: Legal Next of Kin    Primary Decision Maker: HiginioStanton - Brother/Sister - [phone]    Secondary Decision Maker: Marissa Sylvester - Niece/Nephew - [phone]    Discharge Planning:    Patient lives with:   Type of Home:    Primary Care Giver: Self  Patient Support Systems include: Family Members   Current Financial resources: Medicare  Current community resources: ECF/Home Care, Other (Comment) ([facility] (PT REPORT WAS NEVER OPENED BECUASE SHE WAS NOT HOME FOR LONG BEFORE COMING IN TO THE HOSPITAL AGAIN))  Current services prior to admission:              Current DME:              Type of Home Care services:       ADLS  Prior functional level:

## 2024-04-14 NOTE — ED PROVIDER NOTES
Genitourinary:  Negative for flank pain.   Musculoskeletal:  Negative for back pain.   Psychiatric/Behavioral:  Negative for confusion.    All other systems reviewed and are negative.      Except as noted above the remainder of the review of systems was reviewed and negative.       PAST MEDICAL HISTORY     Past Medical History:   Diagnosis Date    A-fib (HCC)     Chronic diastolic heart failure (HCC) 10/8/2015    CKD (chronic kidney disease), stage III ([facility]) 10/8/2015    Coronary artery disease involving native coronary artery of native heart without angina pectoris 10/8/2015    Essential hypertension 10/8/2015    Family history of coronary artery disease 10/8/2015    History of non-ST elevation myocardial infarction (NSTEMI) 10/8/2015    HTN (hypertension)     Hyperlipidemia 10/8/2015    PAD (peripheral artery disease) ([facility]) 10/8/2015         SURGICAL HISTORY       Past Surgical History:   Procedure Laterality Date    CARDIAC CATHETERIZATION  09/10/15    [facility] W IRVING DO    CARDIAC CATHETERIZATION      CARDIAC PROCEDURE N/A 1/30/2024    Left heart cath / coronary angiography possible percutaneous coronary intervention room 164 performed by Miki Tate MD at [facility] CARDIAC CATH LAB    CARDIAC PROCEDURE N/A 1/30/2024    Ventricular assist device (VAD) insertion performed by Miki Tate MD at [facility] CARDIAC CATH LAB    TOE AMPUTATION Right 02/2016         CURRENT MEDICATIONS       Previous Medications    ATORVASTATIN (LIPITOR) 80 MG TABLET    Take 1 tablet by mouth nightly    FREESTYLE LANCETS MISC    USE 3 TIMES DAILY    GLUCOSE BLOOD VI TEST STRIPS (ASCENSIA AUTODISC VI;ONE TOUCH ULTRA TEST VI) STRIP    TEST BLOOD SUGAR TWICE A DAY    INSULIN GLARGINE (LANTUS;BASAGLAR) 100 UNIT/ML INJECTION PEN    Inject 4 Units into the skin every morning    INSULIN LISPRO (HUMALOG) 100 UNIT/ML SOLN INJECTION VIAL    Inject 1-12 Units into the skin 3 times daily (before meals)    ISOSORBIDE MONONITRATE (IMDUR) 30 MG EXTENDED  0553 04/14/24 0615   BP: 113/62 100/72 91/67 100/63   Pulse: (!) 106 99 98 (!) 103   Resp: 18 20     Temp: 97.6 °F (36.4 °C)  98 °F (36.7 °C)    TempSrc: Oral      SpO2: 95%  93% 98%   Weight: 63.5 kg (140 lb)      Height: 1.626 m (5' 4\")          No leukocytosis  Nonischemic EKG, doubt ACS  Medical Decision Making  Amount and/or Complexity of Data Reviewed  Labs: ordered.  Radiology: ordered.  ECG/medicine tests: ordered.    Risk  Prescription drug management.  Decision regarding hospitalization.    Patient with a known history of cholelithiasis presents with repeated episodes of right upper quadrant pain.  IV obtained, labs sent, given IV morphine and Zofran, will reevaluate.  Unable to obtain an ultrasound at this time, therefore CT ordered, without contrast secondary to the patient starting dialysis less than 2 months ago  Patient presents emergency department with right upper quadrant tenderness, known history of ESRD and CHF.  Septic fluid bolus not given secondary to this.  Cholelithiasis noted, she can have an inpatient ultrasound of the gallbladder, no transaminitis, I doubt cholecystitis.  She does have a right lower lobe pneumonia on imaging which adequately explains her not feeling well, systemic symptoms including nausea and dry heaving and tachycardia.  Given age, comorbidities, significant infiltrate, will admit for further IV antibiotics, blood cultures pending, hemodynamically stable.  Exclusion criteria - the patient is NOT to be included for SEP-1 Core Measure due to: 2+ SIRS criteria are not met  A thoracentesis would be therapeutic and diagnostic    REASSESSMENT     ED Course as of 04/22/24 1859   Sun Apr 14, 2024   0941 Creatinine(!): 3.93 [NA]   0941 BUN,BUNPL(!): 35 [NA]   0941 Pro-BNP: >70,000 [NA]   0941 Procalcitonin(!): 0.25 [NA]      ED Course User Index  [NA] Anthony Kelsey MD       CONSULTS:  None    PROCEDURES:  Unless otherwise noted below, none     Procedures    Dr ledesma

## 2024-04-14 NOTE — H&P
History and Physical    Admit Date: 4/14/2024  PCP: Brittnee Carrizales MD    CHIEF COMPLAINT:    Chief Complaint   Patient presents with    Abdominal Pain     Pt reports finding gall stone in January and the pain has continued but worsened today        HISTORY OF PRESENT ILLNESS:    The patient is a 73 y.o. female with a past medical history of cardiomyopathy/CHF EF 10%, CKD 3 progress to end-stage renal disease on HD, A-fib on Coumadin, HTN, HLD, PAD, recent hospitalization at [facility] after being transferred from [facility] with cardiogenic shock requiring Impella who presents with abdominal pain.  She reports abdominal pain has been ongoing for over a month however worse today so she came to the emergency room.  In the ED patient was noted to have stable vitals.  Patient denies any changes in bowel movements.  No fever or chills.  No nausea or vomiting.  No hematochezia or melena.  No hematemesis.  No chest pain.  She has dyspnea at her baseline but does not change.  She is not on oxygen at home.  She is on 2 L/min nasal cannula started in the ER but her saturation is appropriate in the high 90s.    In the ED, CT of the abdomen and chest x-ray showed moderate to large right-sided pleural effusion with consolidative infiltrate.  She has no leukocytosis or fever.  Procalcitonin is 0.25.    Past Medical History:        Diagnosis Date    A-fib (HCC)     Chronic diastolic heart failure (HCC) 10/8/2015    CKD (chronic kidney disease), stage III (HCC) 10/8/2015    Coronary artery disease involving native coronary artery of native heart without angina pectoris 10/8/2015    Essential hypertension 10/8/2015    Family history of coronary artery disease 10/8/2015    History of non-ST elevation myocardial infarction (NSTEMI) 10/8/2015    HTN (hypertension)     Hyperlipidemia 10/8/2015    PAD (peripheral artery disease) (HCC) 10/8/2015       Past Surgical History:        Procedure Laterality Date    CARDIAC CATHETERIZATION  09/10/15     11.4 (L) >60    Calcium 9.6 8.5 - 9.9 mg/dL    Total Protein 6.8 6.3 - 8.0 g/dL    Albumin 3.7 3.5 - 4.6 g/dL    Total Bilirubin 1.1 (H) 0.2 - 0.7 mg/dL    Alkaline Phosphatase 105 40 - 130 U/L    ALT 69 (H) 0 - 33 U/L     (H) 0 - 35 U/L    Globulin 3.1 2.3 - 3.5 g/dL   Lipase    Collection Time: 04/14/24  6:10 AM   Result Value Ref Range    Lipase 43 12 - 95 U/L   Protime-INR    Collection Time: 04/14/24  6:45 AM   Result Value Ref Range    Protime 24.2 (H) 12.3 - 14.9 sec    INR 2.2    APTT    Collection Time: 04/14/24  6:45 AM   Result Value Ref Range    aPTT 30.3 24.4 - 36.8 sec   Brain Natriuretic Peptide    Collection Time: 04/14/24  6:45 AM   Result Value Ref Range    Pro-BNP >70,000 pg/mL   Lactate, Sepsis    Collection Time: 04/14/24  6:45 AM   Result Value Ref Range    Lactic Acid, Sepsis 1.9 0.5 - 1.9 mmol/L   Magnesium    Collection Time: 04/14/24  6:45 AM   Result Value Ref Range    Magnesium 2.5 (H) 1.7 - 2.4 mg/dL   Procalcitonin    Collection Time: 04/14/24  6:45 AM   Result Value Ref Range    Procalcitonin 0.25 (H) 0.00 - 0.15 ng/mL   Respiratory Panel, Molecular, with COVID-19 (Restricted: peds pts or suitable admitted adults)    Collection Time: 04/14/24  7:14 AM    Specimen: Nasopharyngeal   Result Value Ref Range    Adenovirus by PCR Not Detected Not Detected    Bordetella parapertussis by PCR Not Detected Not Detected    Bordetella pertussis by PCR Not Detected Not Detected    Chlamydophilia pneumoniae by PCR Not Detected Not Detected    Coronavirus 229E by PCR Not Detected Not Detected    Coronavirus HKU1 by PCR Not Detected Not Detected    Coronavirus NL63 by PCR Not Detected Not Detected    Coronavirus OC43 by PCR Not Detected Not Detected    SARS-CoV-2, PCR Not Detected Not Detected    Human Metapneumovirus by PCR Not Detected Not Detected    Human Rhinovirus/Enterovirus by PCR Not Detected Not Detected    Influenza A by PCR Not Detected Not Detected    Influenza B by PCR Not

## 2024-04-14 NOTE — PROGRESS NOTES
[facility]   Pharmacy Pharmacokinetic Monitoring Service - Vancomycin     Aurelia Thornton is a 73 y.o. female starting on vancomycin therapy for HAP. Pharmacy consulted by Dr Sr for monitoring and adjustment.    Target Concentration: Dosing based on anticipated concentration <15 mg/L due to renal impairment/insufficiency    Additional Antimicrobials: Zosyn    Pertinent Laboratory Values:   Wt Readings from Last 1 Encounters:   04/14/24 63.5 kg (140 lb)     Temp Readings from Last 1 Encounters:   04/14/24 98.6 °F (37 °C) (Oral)     Estimated Creatinine Clearance: 11 mL/min (A) (based on SCr of 3.93 mg/dL (H)).  Recent Labs     04/14/24  0530 04/14/24  0610   CREATININE  --  3.93*   BUN  --  35*   WBC 7.6  --      Procalcitonin: 0.25    Pertinent Cultures:  Culture Date Source Results   04/14/24 Blood  process   MRSA Nasal Swab: not ordered. Order placed by pharmacy.    Plan:  Concentration-guided dosing due to renal impairment/insufficiency  Start vancomycin 1250 mg IV today  Renal labs as indicated - follow closely  Vancomycin concentration ordered for 04/15/24 @ 1100   Pharmacy will continue to monitor patient and adjust therapy as indicated    Thank you for the consult,  Nancy Alejandro [facility]  4/14/2024 9:39 AM

## 2024-04-14 NOTE — ACP (ADVANCE CARE PLANNING)
Advance Care Planning   Healthcare Decision Maker:    Primary Decision Maker: Stanton Sylvester - Brother/Sister - [phone]    Secondary Decision Maker: Marissa Sylvester - Niece/Nephew - [phone]    Click here to complete Healthcare Decision Makers including selection of the Healthcare Decision Maker Relationship (ie \"Primary\").  Today we documented Decision Maker(s) consistent with Legal Next of Kin hierarchy.       If the relationship to the patient does NOT follow our state's Next of Kin hierarchy, the patient MUST complete an ACP Document to allow him/her to act on the patient's behalf. :      Electronically signed by ANKUR Perera on 4/14/2024 at 11:03 AM

## 2024-04-14 NOTE — CARE COORDINATION
LSW MET WITH PT AT BEDSIDE. PT IS ALERT AND ORIENTED X4. PT REPORT, WAS AT [facility] THEN DC TO [facility] THEN JUST DC HOME WITH [facility]. PT REPORT, [facility] NEVER OPENED HER BECAUSE SHE WAS NOT HOME LONG THEN CAME BACK TO THE HOSPITAL AGAIN.   PT LIVES AT HOME ALONE. USE WALKER AND WHEELCHAIR IS ON THE WAY. NO HOME O2. HD WITH [facility] ON M/W/F @ 6AM.   DISCUSSED DC PLAN WITH PT. PT WOULD LIKE TO GO HOME AT DC AND RESUME ALL SERVICES. REFUSE TO RETURN TO [facility].     LSW MESSAGED CLARK TO SEE IF PT WAS ACTIVE WITH [facility].   WAITING FOR CONFORMATION.     PER CLARK, PT WAS DC FROM [facility] ON FRIDAY 4/12/2024 WITH [facility] NOT [facility].  LSW CALLED [facility]. NO ANSWER. LEFT VM. WAITING FOR CONFORMATION.     Electronically signed by ANKUR Perera on 4/14/2024 at 11:07 AM

## 2024-04-14 NOTE — CONSULTS
Inpatient consult to Cardiology  Consult performed by: Donavon Baum MD  Consult ordered by: Rama Sr DO        Patient Name: Aurelia Thornton  Admit Date: 2024  5:01 AM  MR #: [id number]  : 1950    Attending Physician: Rama Sr DO  Reason for consult: CHF    History of Presenting Illness:      Aurelia Thornton is a 73 y.o. female on hospital day 0 with a history of .   History Obtained From:  patient, electronic medical record    Pt admitted with abd pain. Noted to have Cholelithiasis. Pt denies CP nor SOB. Not on any O2.    Pt has end stage Heart and severe PAD.     She has severe inoperable 3V CAD. No targets to bypass nor PCI.  At [facility] in 2024 she has LV Thrombus and started Warfarin.    She also has PAF.  Recent LVEF 28% at [facility]    CV meds currently held due to Low BP.  She is also on high dose Midodrine  History:      EKG:   Past Medical History:   Diagnosis Date    A-fib ([facility])     Chronic diastolic heart failure (HCC) 10/8/2015    CKD (chronic kidney disease), stage III ([facility]) 10/8/2015    Coronary artery disease involving native coronary artery of native heart without angina pectoris 10/8/2015    Essential hypertension 10/8/2015    Family history of coronary artery disease 10/8/2015    History of non-ST elevation myocardial infarction (NSTEMI) 10/8/2015    HTN (hypertension)     Hyperlipidemia 10/8/2015    PAD (peripheral artery disease) ([facility]) 10/8/2015     Past Surgical History:   Procedure Laterality Date    CARDIAC CATHETERIZATION  09/10/15    [facility] W IRVING DO    CARDIAC CATHETERIZATION      CARDIAC PROCEDURE N/A 2024    Left heart cath / coronary angiography possible percutaneous coronary intervention room 164 performed by Miki Tate MD at [facility] [place] [facility]    CARDIAC PROCEDURE N/A 2024    Ventricular assist device (VAD) insertion performed by Miki Tate MD at [facility] [place]ARD[facility] [facility]    TOE AMPUTATION Right 2016       Family History  History

## 2024-04-14 NOTE — CONSULTS
Pulmonary Medicine  Consult Note      Reason for consultation: Pleural effusion    Consulting physician: Dr. Burk    HISTORY OF PRESENT ILLNESS:      She is 73 y.o. female with a past medical history of cardiomyopathy/CHF EF 10-15%, CKD 3 progress to end-stage renal disease on HD, A-fib on Coumadin, HTN, HLD, PAD, recent hospitalization at [facility] after being transferred from [facility] with cardiogenic shock requiring Impella who presents with abdominal pain.  She reports abdominal pain has been ongoing for over a month however worse today so she came to the emergency room.  In the ED patient was noted to have stable vitals.  Patient denies any changes in bowel movements.  No nausea or vomiting.    No chest pain.  She has dyspnea at her baseline but does not change.  She is not on oxygen at home.  She is on 2 L/min nasal cannula started in the ER but her saturation is appropriate in the high 90s.  She had chest x-ray shows moderate right-sided pleural effusion and small left-sided pleural effusion.  There is also possibility of consolidative infiltration.  Empirically she was started on IV Zosyn and vancomycin.  She had procalcitonin 0.25 WBC is within normal range.  No cough or sputum production and no fever.  She has been on Coumadin therapy and INR is 2.2     Past Medical History:        Diagnosis Date    A-fib (HCC)     Chronic diastolic heart failure (HCC) 10/8/2015    CKD (chronic kidney disease), stage III ([facility]) 10/8/2015    Coronary artery disease involving native coronary artery of native heart without angina pectoris 10/8/2015    Essential hypertension 10/8/2015    Family history of coronary artery disease 10/8/2015    History of non-ST elevation myocardial infarction (NSTEMI) 10/8/2015    HTN (hypertension)     Hyperlipidemia 10/8/2015    PAD (peripheral artery disease) (HCC) 10/8/2015       Past Surgical History:        Procedure Laterality Date    CARDIAC CATHETERIZATION  09/10/15    [facility] W IRVING DO

## 2024-04-15 NOTE — SIGNIFICANT EVENT
Code note:        @0045 Rapid response called by monitor tech, who states he called the rapid bc HR is in 30s and 40s, and states that patient had decreased responsiveness.     @0045 1 [facility] nurses responded to room, and patient has some minor moaning with significant physical stimulation including sternal rub, and pinching. Spo2 finger probe placed and seen dropping from 68% down to 40s. Placed chin lift, while other staff went to grab oxygen mask, and in the mean time turned her nasal cannula up.     @0047 Her breathing became more agonal, and code blue was called, and her pulses were no longer detectable by palpation.    CPR started.    @0049 First dose epi in.    @0050 pulse check found PEA with HR 39. CPR resumed.     @0052 Epi # 2 in.    @0052 Pulse check found  and carotid pulse able to be palpated.     When asked her name at that time, patient answered \"Aurelia.\"   ROSC achieved.     @0058 Patient being transferred to ICU with Rns and Rts. Nursing supervisor, Kymberly called patient's sister to let her know what happened, and that patient is being transferred to ICU. Her sister stated she can let other family members know as needed.       Stacy GARCIAN, RN, [facility] [place]  4/15/2024 1:07 AM

## 2024-04-15 NOTE — DEATH NOTES
Death Pronouncement Note  Patient's Name: Aurelia Thornton   Patient's YOB: 1950  MRN Number: [id number]    Admitting Provider: Rama Sr DO  Attending Provider: Rama Sr DO    Patient was examined and the following were absent: Pulses, Blood Pressure, and Respiratory effort    I declared the patient dead on 4/15/2024 at 1:50 AM    Preliminary Cause of Death: Cardiac arrest     Electronically signed by Liliane Shore DO on 4/15/24 at 1:56 AM EDT

## 2024-04-15 NOTE — PROCEDURES
PROCEDURE NOTE  Date: 4/15/2024   Name: Aurelia Thornton  YOB: 1950    Intubation    Date/Time: 4/15/2024 1:20 AM    Performed by: Liliane Shore DO  Authorized by: Liliane Shore DO  Consent: The procedure was performed in an emergent situation.  Patient identity confirmed: arm band and hospital-assigned identification number  Indications: respiratory failure and airway protection  Intubation method: video-assisted  Patient status: unconscious  Preoxygenation: BVM  Pretreatment medications: fentanyl  Sedatives: etomidate  Paralytic: none  Laryngoscope size: Mac 3  Tube size: 7.5 mm  Tube type: cuffed  Number of attempts: 1  Cricoid pressure: yes  Cords visualized: yes  Post-procedure assessment: chest rise and CO2 detector  Breath sounds: equal  Cuff inflated: yes  ETT to teeth: 21 cm  Tube secured with: ETT box  Chest x-ray interpreted by me.  Chest x-ray findings: endotracheal tube in appropriate position  Patient tolerance: patient tolerated the procedure well with no immediate complications

## 2024-04-15 NOTE — SIGNIFICANT EVENT
Code blue called 0136. CPR started.     0138: Pulse check, no pulse, PEA.    0140: Epi given. Pulse check, no pulse, PEA.    0142: Pulse check, no pulse, PEA. Epi given.     0144: 1 amp sodium bicarb given. Calcium gluconate given. Pulse check, no pulse, PEA.    0145: Epi given. Epi gtt turned off.    0146: Pulse check, no pulse, PEA.    0147: Epi given.    0148: Pulse check, no pulse, fine V-fib. 125 J shock administered.     0149: Epi given. Pulse check early per Dr. Shore, no pulse, PEA.    Time of death called 0150.     Staff in room: Anusha Castellon RN, Kym Day RN, Chepe Pascal RN, Juan Diego Livingston RN, Katt Barclay RN, Mikayla Austin RN, Kymberly Dias RN (nursing supervisor), Carolynn Blair (respiratory therapist), Chelly Alejandro (respiratory therapist), Dr. Shore, Carmen Orr (pharmacist), Delaney Thornton (phlebotomist).

## 2024-04-15 NOTE — SIGNIFICANT EVENT
RR called to room 187 at 0045hrs.  Converted to code blue while I was en route to room.  CPR had already been initiated on my quick arrival to the room.  Patient was reportedly increasing altered mental status and some new bradycardia and hypotension per room nurse.  He had just sent out a [employer] message to me reporting those, but is not yet been received in between other multiple pages for admissions.  Patient found to be initially unresponsive, no spontaneous respirations or heartbeat.  On pulse check, irregular bradycardia with only intermittent palpable pulse.  Patient received a total of 2 rounds of ACLS and 2 pushes of epinephrine, achieved ROSC.  No shockable rhythms.  Was prepped in initial 187 room on 1 W. cardiac floor for advanced airway, but began to be more arousable and had some response to verbal stimuli and apparent awakening in the room, so advanced airway was no longer indicated at that time.  Patient was rapidly transferred to ICU for higher level of care, targeted to ICU bed 15.  On arrival to ICU bed 15, patient apparently had worsening altered mental status again, lost respiratory drive, started to have compressions initiated but yelled out when initial compressions started and was found to still have palpable pulse, but no spontaneous respirations or other response to stimuli.  Decision made at that time to intubate patient for airway protection in setting of acute respiratory failure.  Please see separate procedure note for additional details regarding intubation.  Patient additionally bradycardic in the low 40s, seemingly sinus bradycardia at this time, no evidence of high-grade second-degree type II or third-degree AV block.  After initial stabilization, new twelve-lead EKG performed in the ICU demonstrating nonspecific interventricular block, sinus bradycardia, no acute ST elevations but some scattered T wave inversions primarily in V1-3 of the precordial leads.  Continues on  pre-existing heparin drip for reported left-sided cardiac thrombus, per 1 W. nurse.  That will be continued at this time.  No evidence of active bleeding currently.  Mechanical ventilation with lung protective strategy, initially at ~7 cc/KG IBW, with plan for ABG ~30 minutes postintubation, pending at this time.      Electronically signed by Liliane Shore DO on 4/15/2024 at 1:19 AM

## 2024-04-15 NOTE — SIGNIFICANT EVENT
Repeat code blue called to ICU room 15 at 0137hrs.  Patient had recurrent PEA arrest.  ACLS was initiated immediately by ICU staff already present in the room.  In brief, patient had 5 rounds of ACLS and epinephrine during this event, had PEA with no palpable pulse during the entire event.  Calcium and Bicarb given, also without apparent benefit.  Due to possible low amplitude coarse Vfib waves on Defib monitor, single defib was attemped at 125J, with no benefit.  H&T cardiac arrest etiologies was reviewed.  Highest concern for possible VTE, given reported pre-existing left sided cardiac thrombus currently on heparin gtt.  Family (sister) contacted by nursing House Supervisor during code, updated on events.  Please see nursing code documentation for additional details.    Electronically signed by Liliane Shore DO on 4/15/2024 at 1:55 AM

## 2024-04-15 NOTE — PROGRESS NOTES
@ 0005 Annette RN told this RN that the patient's blood pressure \"was low\". This RN eventually obtained a manual blood pressure cuff and performed VS @ 0025 and secure messaged Dr. Shore updates on the patient's status of low blood pressure, telemetry, and patient's drowsiness. @ 0041 the monitor tech called this RN to update that the patient's HR was in the 40s, @ 0042 this RN was at bedside assessing the patient along with the monitor tech. @ 0042 this RN told the monitor tech to call a rapid. The rapid team arrived @ 0046, the patient's status declined further and @ 0048 a Code Blue was called.

## 2024-04-15 NOTE — FLOWSHEET NOTE
Rec'd in ICU bed 15  from [facility] at 0100. Noted to be unresponsive and immediate code called for no pulse.  See code notes. Intubated at 0120 per Dr. Shore.  Report rec'd from Elver PrajapatiW IZABELLA.   Dr. Shore at bedside. Xray completed with confirmation of O/G tube and ETTube. At 0138, second code blue called.  See documentation.

## 2024-04-15 NOTE — FLOWSHEET NOTE
Sister, Stanton, notified of death by RN supervisor at 0157.  Support given.  End of life care provided. At 0330, Dr. Sebastian () notified and informed this nurse to release body.  [facility] notified for possible assessment. Reference number is #[id number]. Informed by nurse who attended code patient did not receive 1000cc bolus as reported to this nurse.  [facility] informed.

## 2024-04-16 LAB
MRSA, DNA, NASAL: NEGATIVE
SPECIMEN DESCRIPTION: NORMAL

## 2024-04-19 LAB
BACTERIA BLD CULT ORG #2: NORMAL
BACTERIA BLD CULT: NORMAL

## (undated) DEVICE — PACK PROCEDURE SURG SURG CARDIAC CATH

## (undated) DEVICE — DEVICE CLSR COMPR SYS INTEGR MNOMTR INFL TRNSPAR DOME ADJ

## (undated) DEVICE — TUBING O2 STAR LUMEN 7 FT W/ STD CONN CLR LF

## (undated) DEVICE — BAND COMPR L24CM REG CLR PLAS HEMSTAT EXT HK AND LOOP RETEN

## (undated) DEVICE — 3M™ TEGADERM™ TRANSPARENT FILM DRESSING FRAME STYLE, 1626W, 4 IN X 4-3/4 IN (10 CM X 12 CM), 50/CT 4CT/CASE: Brand: 3M™ TEGADERM™

## (undated) DEVICE — 3M™ TEGADERM™ TRANSPARENT FILM DRESSING FRAME STYLE, 1627, 4 IN X 10 IN (10 CM X 25 CM), 20/CT 4CT/CASE: Brand: 3M™ TEGADERM™

## (undated) DEVICE — INTRODUCER SHTH 0.018 IN 4 FRX40 CM KT SFT TIP NIT VSI 7266V

## (undated) DEVICE — CANNULA NSL AD L7FT CLR VYN CRV PRNG NONFLARED TIP STD CONN

## (undated) DEVICE — GUIDEWIRE VASC L260CM DIA0.035IN TIP L3MM STD EXCHG PTFE J

## (undated) DEVICE — ELECTRODE TRAIN EDGE SYS W/ QUIK-COMBO CONN

## (undated) DEVICE — ANGIOGRAPHIC CATHETER: Brand: IMPULSE™

## (undated) DEVICE — SHEATH RAIN RAD INTRO SHTH W/ BARE WIRE 10 CM 506610S

## (undated) DEVICE — KIT ANGIO W/ AT P65 PREM HND CTRL FOR CNTRST DEL ANGIOTOUCH

## (undated) DEVICE — RADIFOCUS OPTITORQUE ANGIOGRAPHIC CATHETER: Brand: OPTITORQUE

## (undated) DEVICE — KIT MFLD ISOLATN NACL CNTRST PRT TBNG SPIK W/ PRSS TRNSDUC